# Patient Record
Sex: FEMALE | Race: WHITE | Employment: FULL TIME | URBAN - METROPOLITAN AREA
[De-identification: names, ages, dates, MRNs, and addresses within clinical notes are randomized per-mention and may not be internally consistent; named-entity substitution may affect disease eponyms.]

---

## 2017-04-13 RX ORDER — PRAVASTATIN SODIUM 40 MG
TABLET ORAL
Qty: 30 TABLET | Refills: 0 | Status: SHIPPED | OUTPATIENT
Start: 2017-04-13 | End: 2017-06-05 | Stop reason: SDUPTHER

## 2017-05-08 RX ORDER — DULAGLUTIDE 0.75 MG/.5ML
INJECTION, SOLUTION SUBCUTANEOUS
Qty: 2 ML | Refills: 0 | Status: SHIPPED | OUTPATIENT
Start: 2017-05-08 | End: 2017-06-06 | Stop reason: SDUPTHER

## 2017-06-06 RX ORDER — PRAVASTATIN SODIUM 40 MG
TABLET ORAL
Qty: 30 TABLET | Refills: 0 | Status: SHIPPED | OUTPATIENT
Start: 2017-06-06 | End: 2017-07-04 | Stop reason: SDUPTHER

## 2017-06-06 RX ORDER — DULAGLUTIDE 0.75 MG/.5ML
INJECTION, SOLUTION SUBCUTANEOUS
Qty: 2 ML | Refills: 0 | Status: SHIPPED | OUTPATIENT
Start: 2017-06-06 | End: 2017-07-03 | Stop reason: SDUPTHER

## 2017-07-05 RX ORDER — PRAVASTATIN SODIUM 40 MG
TABLET ORAL
Qty: 30 TABLET | Refills: 2 | Status: SHIPPED | OUTPATIENT
Start: 2017-07-05 | End: 2017-10-03 | Stop reason: SDUPTHER

## 2017-07-17 ENCOUNTER — OFFICE VISIT (OUTPATIENT)
Dept: FAMILY MEDICINE CLINIC | Age: 47
End: 2017-07-17

## 2017-07-17 VITALS
HEART RATE: 72 BPM | BODY MASS INDEX: 41.14 KG/M2 | HEIGHT: 66 IN | SYSTOLIC BLOOD PRESSURE: 126 MMHG | RESPIRATION RATE: 20 BRPM | WEIGHT: 256 LBS | TEMPERATURE: 97.9 F | DIASTOLIC BLOOD PRESSURE: 82 MMHG

## 2017-07-17 DIAGNOSIS — S56.911A FOREARM STRAIN, RIGHT, INITIAL ENCOUNTER: ICD-10-CM

## 2017-07-17 DIAGNOSIS — E11.9 TYPE 2 DIABETES MELLITUS WITHOUT COMPLICATION, WITHOUT LONG-TERM CURRENT USE OF INSULIN (HCC): ICD-10-CM

## 2017-07-17 DIAGNOSIS — E78.00 PURE HYPERCHOLESTEROLEMIA: ICD-10-CM

## 2017-07-17 DIAGNOSIS — E11.9 TYPE 2 DIABETES MELLITUS WITHOUT COMPLICATION, WITHOUT LONG-TERM CURRENT USE OF INSULIN (HCC): Primary | ICD-10-CM

## 2017-07-17 DIAGNOSIS — E66.01 OBESITY, MORBID, BMI 40.0-49.9 (HCC): ICD-10-CM

## 2017-07-17 LAB
A/G RATIO: 1.5 (ref 1.1–2.2)
ALBUMIN SERPL-MCNC: 4 G/DL (ref 3.4–5)
ALP BLD-CCNC: 82 U/L (ref 40–129)
ALT SERPL-CCNC: 10 U/L (ref 10–40)
ANION GAP SERPL CALCULATED.3IONS-SCNC: 14 MMOL/L (ref 3–16)
AST SERPL-CCNC: 7 U/L (ref 15–37)
BILIRUB SERPL-MCNC: 0.3 MG/DL (ref 0–1)
BUN BLDV-MCNC: 12 MG/DL (ref 7–20)
CALCIUM SERPL-MCNC: 9.3 MG/DL (ref 8.3–10.6)
CHLORIDE BLD-SCNC: 104 MMOL/L (ref 99–110)
CHOLESTEROL, TOTAL: 171 MG/DL (ref 0–199)
CO2: 22 MMOL/L (ref 21–32)
CREAT SERPL-MCNC: <0.5 MG/DL (ref 0.6–1.1)
ESTIMATED AVERAGE GLUCOSE: 159.9 MG/DL
GFR AFRICAN AMERICAN: >60
GFR NON-AFRICAN AMERICAN: >60
GLOBULIN: 2.7 G/DL
GLUCOSE BLD-MCNC: 148 MG/DL (ref 70–99)
HBA1C MFR BLD: 7.2 %
HDLC SERPL-MCNC: 49 MG/DL (ref 40–60)
LDL CHOLESTEROL CALCULATED: 105 MG/DL
POTASSIUM SERPL-SCNC: 4.4 MMOL/L (ref 3.5–5.1)
SODIUM BLD-SCNC: 140 MMOL/L (ref 136–145)
TOTAL PROTEIN: 6.7 G/DL (ref 6.4–8.2)
TRIGL SERPL-MCNC: 86 MG/DL (ref 0–150)
VLDLC SERPL CALC-MCNC: 17 MG/DL

## 2017-07-17 PROCEDURE — 99214 OFFICE O/P EST MOD 30 MIN: CPT | Performed by: FAMILY MEDICINE

## 2017-08-09 ENCOUNTER — TELEPHONE (OUTPATIENT)
Dept: FAMILY MEDICINE CLINIC | Age: 47
End: 2017-08-09

## 2017-08-09 RX ORDER — FLUCONAZOLE 150 MG/1
150 TABLET ORAL ONCE
Qty: 2 TABLET | Refills: 0 | Status: SHIPPED | OUTPATIENT
Start: 2017-08-09 | End: 2017-08-09

## 2017-08-17 ENCOUNTER — TELEPHONE (OUTPATIENT)
Dept: FAMILY MEDICINE CLINIC | Age: 47
End: 2017-08-17

## 2017-08-17 RX ORDER — FLUCONAZOLE 100 MG/1
100 TABLET ORAL DAILY
Qty: 10 TABLET | Refills: 0 | Status: SHIPPED | OUTPATIENT
Start: 2017-08-17 | End: 2017-08-27

## 2017-10-03 RX ORDER — PRAVASTATIN SODIUM 40 MG
TABLET ORAL
Qty: 30 TABLET | Refills: 5 | Status: SHIPPED | OUTPATIENT
Start: 2017-10-03 | End: 2018-04-09 | Stop reason: SDUPTHER

## 2017-12-28 DIAGNOSIS — E11.9 TYPE 2 DIABETES MELLITUS WITHOUT COMPLICATION, WITHOUT LONG-TERM CURRENT USE OF INSULIN (HCC): Primary | ICD-10-CM

## 2017-12-28 RX ORDER — BLOOD-GLUCOSE METER
1 EACH MISCELLANEOUS DAILY
Qty: 1 DEVICE | Refills: 0 | Status: SHIPPED | OUTPATIENT
Start: 2017-12-28

## 2018-01-05 RX ORDER — DULAGLUTIDE 1.5 MG/.5ML
INJECTION, SOLUTION SUBCUTANEOUS
Qty: 2 ML | Refills: 0 | Status: SHIPPED | OUTPATIENT
Start: 2018-01-05 | End: 2018-02-02 | Stop reason: SDUPTHER

## 2018-01-15 ENCOUNTER — OFFICE VISIT (OUTPATIENT)
Dept: FAMILY MEDICINE CLINIC | Age: 48
End: 2018-01-15

## 2018-01-15 VITALS
DIASTOLIC BLOOD PRESSURE: 80 MMHG | RESPIRATION RATE: 18 BRPM | BODY MASS INDEX: 41.95 KG/M2 | HEART RATE: 86 BPM | WEIGHT: 261 LBS | OXYGEN SATURATION: 98 % | HEIGHT: 66 IN | SYSTOLIC BLOOD PRESSURE: 124 MMHG

## 2018-01-15 DIAGNOSIS — E55.9 VITAMIN D DEFICIENCY: ICD-10-CM

## 2018-01-15 DIAGNOSIS — Z23 NEED FOR INFLUENZA VACCINATION: ICD-10-CM

## 2018-01-15 DIAGNOSIS — E11.9 TYPE 2 DIABETES MELLITUS WITHOUT COMPLICATION, WITHOUT LONG-TERM CURRENT USE OF INSULIN (HCC): ICD-10-CM

## 2018-01-15 DIAGNOSIS — E11.9 TYPE 2 DIABETES MELLITUS WITHOUT COMPLICATION, WITHOUT LONG-TERM CURRENT USE OF INSULIN (HCC): Primary | ICD-10-CM

## 2018-01-15 DIAGNOSIS — E78.00 PURE HYPERCHOLESTEROLEMIA: ICD-10-CM

## 2018-01-15 LAB
A/G RATIO: 1.5 (ref 1.1–2.2)
ALBUMIN SERPL-MCNC: 4 G/DL (ref 3.4–5)
ALP BLD-CCNC: 90 U/L (ref 40–129)
ALT SERPL-CCNC: 12 U/L (ref 10–40)
ANION GAP SERPL CALCULATED.3IONS-SCNC: 16 MMOL/L (ref 3–16)
AST SERPL-CCNC: 11 U/L (ref 15–37)
BILIRUB SERPL-MCNC: <0.2 MG/DL (ref 0–1)
BUN BLDV-MCNC: 16 MG/DL (ref 7–20)
CALCIUM SERPL-MCNC: 9.4 MG/DL (ref 8.3–10.6)
CHLORIDE BLD-SCNC: 101 MMOL/L (ref 99–110)
CO2: 24 MMOL/L (ref 21–32)
CREAT SERPL-MCNC: 0.6 MG/DL (ref 0.6–1.1)
CREATININE URINE: 28.2 MG/DL (ref 28–259)
GFR AFRICAN AMERICAN: >60
GFR NON-AFRICAN AMERICAN: >60
GLOBULIN: 2.6 G/DL
GLUCOSE BLD-MCNC: 261 MG/DL (ref 70–99)
MICROALBUMIN UR-MCNC: <1.2 MG/DL
MICROALBUMIN/CREAT UR-RTO: NORMAL MG/G (ref 0–30)
POTASSIUM SERPL-SCNC: 4.8 MMOL/L (ref 3.5–5.1)
SODIUM BLD-SCNC: 141 MMOL/L (ref 136–145)
TOTAL PROTEIN: 6.6 G/DL (ref 6.4–8.2)

## 2018-01-15 PROCEDURE — 90471 IMMUNIZATION ADMIN: CPT | Performed by: INTERNAL MEDICINE

## 2018-01-15 PROCEDURE — 90630 INFLUENZA, QUADV, 18-64 YRS, ID, PF, MICRO INJ, 0.1ML (FLUZONE QUADV, PF): CPT | Performed by: INTERNAL MEDICINE

## 2018-01-15 PROCEDURE — 99214 OFFICE O/P EST MOD 30 MIN: CPT | Performed by: INTERNAL MEDICINE

## 2018-01-15 ASSESSMENT — ENCOUNTER SYMPTOMS
DIARRHEA: 0
SHORTNESS OF BREATH: 1
WHEEZING: 0
CONSTIPATION: 0
COUGH: 0

## 2018-01-15 ASSESSMENT — PATIENT HEALTH QUESTIONNAIRE - PHQ9
SUM OF ALL RESPONSES TO PHQ9 QUESTIONS 1 & 2: 1
SUM OF ALL RESPONSES TO PHQ QUESTIONS 1-9: 1
1. LITTLE INTEREST OR PLEASURE IN DOING THINGS: 0
2. FEELING DOWN, DEPRESSED OR HOPELESS: 1

## 2018-01-15 NOTE — PROGRESS NOTES
1/15/2018    Chief Complaint   Patient presents with    Diabetes     f/u   DM  Glucose  120-140 in the morning  Says she is \"terrible \"  About checking in the evening  Taking care both parents in wheelchairs. Not cooking  At one point lost  50 lbs. Walking  3 miles a day  Works two jobs  Gained  Some of it back  Could not tolerate janumet    Had bp elevation in college. Used to smoke ,  Quit  11 yrs ago. Pt does not have neuropathy  No ulcers feet  ,  No foot lesions  Occasional ingrown toenail.   goes to a nail salon for toenail issues       On pravastatin  Has some myalgias , nothing unmanageable      Occasional diarrhea ,  Chronic   Used  to have all the time in high school     Brought up mental illness in the family  Uncle commited suicide , 2 gparents with mental illness  She is not having wintertime blues            HPI    Review of Systems   Constitutional: Positive for activity change (less stuctured exercise). Negative for appetite change. Respiratory: Positive for shortness of breath (with shoveling driveway). Negative for cough and wheezing. Gastrointestinal: Negative for constipation and diarrhea. Psychiatric/Behavioral: Negative for dysphoric mood. The patient is nervous/anxious (anxious with job ).         Health Maintenance   Topic Date Due    HIV screen  01/03/1987    Diabetic foot exam  10/31/2017    Diabetic microalbuminuria test  10/31/2017    Diabetic retinal exam  05/11/2018    A1C test (Diabetic or Prediabetic)  07/17/2018    Lipid screen  07/17/2018    Cervical cancer screen  08/10/2018    DTaP/Tdap/Td vaccine (2 - Td) 01/09/2022    Flu vaccine  Completed    Pneumococcal med risk  Completed      Social History     Social History    Marital status: Single     Spouse name: N/A    Number of children: 0    Years of education: N/A     Occupational History    clerical      [employer]     Social History Main Topics    Smoking status: Former Smoker     Quit date: 1/1/2007

## 2018-01-15 NOTE — PROGRESS NOTES
Vaccine Information Sheet, \"Influenza - Inactivated\"  given to Aram Rudolph, or parent/legal guardian of  Aram Rudolph and verbalized understanding. Patient responses:    Have you ever had a reaction to a flu vaccine? No  Are you able to eat eggs without adverse effects? Yes  Do you have any current illness? No  Have you ever had [place] Syndrome? No    Flu vaccine given per order. Please see immunization tab.

## 2018-01-16 LAB
ESTIMATED AVERAGE GLUCOSE: 174.3 MG/DL
HBA1C MFR BLD: 7.7 %

## 2018-02-02 RX ORDER — DULAGLUTIDE 1.5 MG/.5ML
INJECTION, SOLUTION SUBCUTANEOUS
Qty: 2 ML | Refills: 3 | Status: SHIPPED | OUTPATIENT
Start: 2018-02-02 | End: 2018-06-08 | Stop reason: SDUPTHER

## 2018-04-02 ENCOUNTER — OFFICE VISIT (OUTPATIENT)
Dept: FAMILY MEDICINE CLINIC | Age: 48
End: 2018-04-02

## 2018-04-02 VITALS
HEART RATE: 83 BPM | DIASTOLIC BLOOD PRESSURE: 84 MMHG | WEIGHT: 262 LBS | TEMPERATURE: 98.1 F | SYSTOLIC BLOOD PRESSURE: 136 MMHG | HEIGHT: 66 IN | BODY MASS INDEX: 42.11 KG/M2

## 2018-04-02 DIAGNOSIS — M25.571 ACUTE RIGHT ANKLE PAIN: ICD-10-CM

## 2018-04-02 DIAGNOSIS — M75.81 ROTATOR CUFF TENDINITIS, RIGHT: Primary | ICD-10-CM

## 2018-04-02 PROCEDURE — 99213 OFFICE O/P EST LOW 20 MIN: CPT | Performed by: FAMILY MEDICINE

## 2018-04-02 RX ORDER — MELOXICAM 15 MG/1
15 TABLET ORAL DAILY
Qty: 14 TABLET | Refills: 0 | Status: SHIPPED | OUTPATIENT
Start: 2018-04-02 | End: 2018-04-13 | Stop reason: SDUPTHER

## 2018-04-09 RX ORDER — PRAVASTATIN SODIUM 40 MG
TABLET ORAL
Qty: 30 TABLET | Refills: 5 | Status: SHIPPED | OUTPATIENT
Start: 2018-04-09 | End: 2018-11-10 | Stop reason: SDUPTHER

## 2018-04-16 ENCOUNTER — HOSPITAL ENCOUNTER (OUTPATIENT)
Dept: OTHER | Age: 48
Discharge: HOME | End: 2018-04-30
Attending: FAMILY MEDICINE | Admitting: FAMILY MEDICINE

## 2018-04-16 ASSESSMENT — PAIN DESCRIPTION - DESCRIPTORS: DESCRIPTORS: SHOOTING

## 2018-04-16 ASSESSMENT — PAIN DESCRIPTION - FREQUENCY: FREQUENCY: INTERMITTENT

## 2018-04-16 ASSESSMENT — PAIN DESCRIPTION - ONSET: ONSET: GRADUAL

## 2018-04-16 ASSESSMENT — PAIN DESCRIPTION - PROGRESSION: CLINICAL_PROGRESSION: GRADUALLY WORSENING

## 2018-04-16 ASSESSMENT — PAIN DESCRIPTION - ORIENTATION: ORIENTATION: RIGHT

## 2018-04-16 ASSESSMENT — PAIN DESCRIPTION - PAIN TYPE: TYPE: CHRONIC PAIN

## 2018-04-16 ASSESSMENT — PAIN SCALES - GENERAL: PAINLEVEL_OUTOF10: 7

## 2018-04-16 ASSESSMENT — PAIN DESCRIPTION - LOCATION: LOCATION: SHOULDER

## 2018-04-23 ENCOUNTER — HOSPITAL ENCOUNTER (OUTPATIENT)
Dept: PHYSICAL THERAPY | Age: 48
Discharge: HOME | End: 2018-04-24
Admitting: FAMILY MEDICINE

## 2018-04-25 ENCOUNTER — HOSPITAL ENCOUNTER (OUTPATIENT)
Dept: PHYSICAL THERAPY | Age: 48
Discharge: HOME | End: 2018-04-26
Admitting: FAMILY MEDICINE

## 2018-04-25 NOTE — FLOWSHEET NOTE
Physical Therapy Daily Treatment Note  Date:  2018    Patient Name:  Dawn Del Valle    :  1970  MRN: [id number]    Restrictions/Precautions: Position Activity Restriction  Other position/activity restrictions: No fall risk    Pertinent Medical History: Additional Pertinent Hx: PLOF: Independent, HTN, DM    Medical/Treatment Diagnosis Information:  · Diagnosis: Rotator Cuff tendinitis, right  · Treatment Diagnosis: Limited mobility and strength of right shoulder, pain and decreased function of right shoulder    Insurance/Certification information:  PT Insurance Information: [employer]  Physician Information:  Referring Practitioner: Dr. Vincent Brady of care signed (Y/N):  Sent to Dr. Kate Falcon 18    Visit# / total visits:    Pain level:  5/10     G-Code (if applicable):      Date / Visit # G-Code Applied:   visit  PT G-Codes  Functional Assessment Tool Used: Ethlyn Kayser  Score: 32  Functional Limitation: Carrying, moving and handling objects  Carrying, Moving and Handling Objects Current Status (): At least 40 percent but less than 60 percent impaired, limited or restricted  Carrying, Moving and Handling Objects Goal Status (): At least 20 percent but less than 40 percent impaired, limited or restricted    Progress Note: [x]  Yes  [x]  No  Next due by: Visit #10      History of Injury: See below  Subjective:  Subjective  Subjective: Pt suffered no incident or injury, but patient is helping her parents with transfers and lifting wheelchairs, doing grocery shopping, laundry, etc and working. Pt  has noted steady loss of mobility and lingering pain. Pain started in right bicep and tricep and had migrated to the right shoulder and she wants to prevent surgery and worsening of pain. 18: Pt reports less shooting pain on shoulder and it was less painful until 6:00 pm the day of treatment.   18: Pt reports that she was feeling much better until she lifted a laundry basket and helped to move scrap metal.  04/25/18 Patient reports shoulder has been more sore last few days for some reason. Objective: See eval  Observation:   Test measurements:        Exercises:  Exercise/Equipment Resistance/Repetitions Other comments   ER/IR with green t band 2 x 10 each Advised to avoid pain or decrease range/reps if painful   Cane ex - flex only 5 sec x 10 4/18/18: Scaption created pain so will hold on this. Continue with flexion    Rows Green 2 x 10 Add                                                             Other Therapeutic Activities:  Patient was educated on diagnosis, plan of care and prognosis of their complaint. Also, frequency and duration of treatments was discussed. Patient was informed of the attendance policy and issued a copy for their records. 4/23/18: Reviewed body mechanics, as pt lifts her parent's wheelchairs into the car. Home Exercise Program: Patient was given written instructions for home exercises as above. Patient performs them correctly and understands purpose. Gave green band to use at home. Manual Treatments:    Crossfriction massage to biceps and STM over right upper arm 10 min kinesio tape biceps area.     Modalities:    US x 8 min at 1/5 W/cm2 to right biceps area, CP to right upper arm x 10 min  Timed Code Treatment Minutes:    45  Total Treatment Minutes:    55    Treatment/Activity Tolerance:  [x] Patient tolerated treatment well [] Patient limited by fatigue  [] Patient limited by pain  [] Patient limited by other medical complications  [] Other:     Prognosis: [x] Good [] Fair  [] Poor    Goals:    Short term goals  Time Frame for Short term goals: 4 weeks  Short term goal 1: Pt will increase right shoulder ROM to nearly WNL  Short term goal 2: Pt will increase Strength to 4+/5 on right shoulder  Short term goal 3: Pt will note decreased pain, indicating healing of injury   Short term goal 4: Pt will be independent in HEP so she can avoid

## 2018-04-30 ENCOUNTER — HOSPITAL ENCOUNTER (OUTPATIENT)
Dept: PHYSICAL THERAPY | Age: 48
Discharge: HOME | End: 2018-05-01
Admitting: FAMILY MEDICINE

## 2018-05-01 ENCOUNTER — HOSPITAL ENCOUNTER (OUTPATIENT)
Dept: OTHER | Age: 48
Discharge: HOME | End: 2018-05-31
Attending: FAMILY MEDICINE | Admitting: FAMILY MEDICINE

## 2018-05-02 ENCOUNTER — HOSPITAL ENCOUNTER (OUTPATIENT)
Dept: PHYSICAL THERAPY | Age: 48
Discharge: HOME | End: 2018-05-03
Admitting: FAMILY MEDICINE

## 2018-05-02 NOTE — FLOWSHEET NOTE
Physical Therapy Daily Treatment Note  Date:  2018    Patient Name:  Meera Cardona    :  1970  MRN: [id number]    Restrictions/Precautions: Position Activity Restriction  Other position/activity restrictions: No fall risk    Pertinent Medical History: Additional Pertinent Hx: PLOF: Independent, HTN, DM    Medical/Treatment Diagnosis Information:  · Diagnosis: Rotator Cuff tendinitis, right  · Treatment Diagnosis: Limited mobility and strength of right shoulder, pain and decreased function of right shoulder    Insurance/Certification information:  PT Insurance Information: [employer]  Physician Information:  Referring Practitioner: Dr. Rain Harvey of care signed (Y/N):  Sent to Dr. Octavio Sosa 18    Visit# / total visits:    Pain level:  5-6/10     G-Code (if applicable):      Date / Visit # G-Code Applied:  18/ visit  PT G-Codes  Functional Assessment Tool Used: Rolm Soto  Score: 32  Functional Limitation: Carrying, moving and handling objects  Carrying, Moving and Handling Objects Current Status (): At least 40 percent but less than 60 percent impaired, limited or restricted  Carrying, Moving and Handling Objects Goal Status (): At least 20 percent but less than 40 percent impaired, limited or restricted    Progress Note: [x]  Yes  [x]  No  Next due by: Visit #10      History of Injury: See below  Subjective:  Subjective  Subjective: Pt suffered no incident or injury, but patient is helping her parents with transfers and lifting wheelchairs, doing grocery shopping, laundry, etc and working. Pt  has noted steady loss of mobility and lingering pain. Pain started in right bicep and tricep and had migrated to the right shoulder and she wants to prevent surgery and worsening of pain. 18: Pt reports less shooting pain on shoulder and it was less painful until 6:00 pm the day of treatment.   18: Pt reports that she was feeling much better until she lifted a laundry basket and helped to move scrap metal.  04/25/18 Patient reports shoulder has been more sore last few days for some reason. 4/30/18: Pt reports that she is better but not as well as she was before the flareup.  05/02/18: Patient reports pain intensity and frequency is decreasing. Objective: See eval  Observation:   Test measurements:        Exercises:  Exercise/Equipment Resistance/Repetitions Other comments   ER/IR with green t band 2 x 10 each Advised to avoid pain or decrease range/reps if painful   Cane ex - flex only 5 sec x 10 4/18/18: Scaption created pain so will hold on this. Continue with flexion    Rows Green 2 x 10 Added   UBE 5 min wl 4 Added 4/30/18                                                        Other Therapeutic Activities:  Patient was educated on diagnosis, plan of care and prognosis of their complaint. Also, frequency and duration of treatments was discussed. Patient was informed of the attendance policy and issued a copy for their records. 4/23/18: Reviewed body mechanics, as pt lifts her parent's wheelchairs into the car. Home Exercise Program: Patient was given written instructions for home exercises as above. Patient performs them correctly and understands purpose. Gave green band to use at home. Manual Treatments:    Crossfriction massage to biceps and STM over right upper arm 10 min, kinesio tape biceps and triceps area.     Modalities:    US x 8 min at 1/5 W/cm2 to right biceps area, CP to right upper arm x 10 min  Timed Code Treatment Minutes:    45  Total Treatment Minutes:    55    Treatment/Activity Tolerance:  [x] Patient tolerated treatment well [] Patient limited by fatigue  [] Patient limited by pain  [] Patient limited by other medical complications  [] Other:     Prognosis: [x] Good [] Fair  [] Poor    Goals:    Short term goals  Time Frame for Short term goals: 4 weeks  Short term goal 1: Pt will increase right shoulder ROM to nearly WNL  Short term goal 2: Pt will increase Strength to 4+/5 on right shoulder  Short term goal 3: Pt will note decreased pain, indicating healing of injury   Short term goal 4: Pt will be independent in HEP so she can avoid future injury              Patient Requires Follow-up: [x] Yes  [] No    Plan:   [] Continue per plan of care [] Alter current plan (see comments)  [x] Plan of care initiated [] Hold pending MD visit [] Discharge    Plan for Next Session: Add exercises and review body mechanics to get wheelchair into car ,reading in bed and using back pack instead of heavy purse.     Electronically signed by:  Lesley Moran PTA,

## 2018-05-07 ENCOUNTER — HOSPITAL ENCOUNTER (OUTPATIENT)
Dept: PHYSICAL THERAPY | Age: 48
Discharge: HOME | End: 2018-05-08
Admitting: FAMILY MEDICINE

## 2018-05-09 ENCOUNTER — HOSPITAL ENCOUNTER (OUTPATIENT)
Dept: PHYSICAL THERAPY | Age: 48
Discharge: HOME | End: 2018-05-10
Admitting: FAMILY MEDICINE

## 2018-05-09 NOTE — FLOWSHEET NOTE
Physical Therapy Daily Treatment Note  Date:  2018    Patient Name:  Camille Jaimes    :  1970  MRN: [id number]    Restrictions/Precautions: Position Activity Restriction  Other position/activity restrictions: No fall risk    Pertinent Medical History: Additional Pertinent Hx: PLOF: Independent, HTN, DM    Medical/Treatment Diagnosis Information:  · Diagnosis: Rotator Cuff tendinitis, right  · Treatment Diagnosis: Limited mobility and strength of right shoulder, pain and decreased function of right shoulder    Insurance/Certification information:  PT Insurance Information: Hum[employer]  Physician Information:  Referring Practitioner: Dr. Phyllis Sauer of care signed (Y/N):  Sent to Dr. Dara Yoder 18    Visit# / total visits:    Pain level:  3-4/10     G-Code (if applicable):      Date / Visit # G-Code Applied:  18/ visit  PT G-Codes  Functional Assessment Tool Used: Pily Math  Score: 32  Functional Limitation: Carrying, moving and handling objects  Carrying, Moving and Handling Objects Current Status (): At least 40 percent but less than 60 percent impaired, limited or restricted  Carrying, Moving and Handling Objects Goal Status (): At least 20 percent but less than 40 percent impaired, limited or restricted    Progress Note: [x]  Yes  [x]  No  Next due by: Visit #10      History of Injury: See below  Subjective:  Subjective  Subjective: Pt suffered no incident or injury, but patient is helping her parents with transfers and lifting wheelchairs, doing grocery shopping, laundry, etc and working. Pt  has noted steady loss of mobility and lingering pain. Pain started in right bicep and tricep and had migrated to the right shoulder and she wants to prevent surgery and worsening of pain. 18: Pt reports less shooting pain on shoulder and it was less painful until 6:00 pm the day of treatment.   18: Pt reports that she was feeling much better until she lifted a laundry basket and helped to move scrap metal.  04/25/18 Patient reports shoulder has been more sore last few days for some reason. 4/30/18: Pt reports that she is better but not as well as she was before the flareup.  05/02/18: Patient reports pain intensity and frequency is decreasing. 5/7/18: Pt reports that her shoulder is improving , but she \"tweaked it\" last night pulling up her sheets. 5/9/18: Pt reports that the pain is still less than it was before, but she still has pain on lateral deltoid of right UE. Objective: See eval  Observation:   Test measurements:        Exercises:  Exercise/Equipment Resistance/Repetitions Other comments   ER/IR with green t band 2 x 10 each Advised to avoid pain or decrease range/reps if painful   Cane ex - flex only 5 sec x 10 4/18/18: Scaption created pain so will hold on this. Continue with flexion    Rows Green 2 x 10 Added   UBE 5 min wl 4 Added 4/30/18   Cane behind back for int rot 10X Added 5/7/18   Rhomboid/tricep stretch  Add                                              Other Therapeutic Activities:  Patient was educated on diagnosis, plan of care and prognosis of their complaint. Also, frequency and duration of treatments was discussed. Patient was informed of the attendance policy and issued a copy for their records. 4/23/18: Reviewed body mechanics, as pt lifts her parent's wheelchairs into the car. Home Exercise Program: Patient was given written instructions for home exercises as above. Patient performs them correctly and understands purpose. Gave green band to use at home. Manual Treatments:    Crossfriction massage to biceps and STM over right upper arm 10 min, kinesio tape biceps and triceps area.     Modalities:    US x 8 min at 1/5 W/cm2 to right biceps area, CP to right upper arm x 10 min  Timed Code Treatment Minutes:    50  Total Treatment Minutes:    60    Treatment/Activity Tolerance:  [x] Patient tolerated treatment well [] Patient limited by fatigue  [] Patient limited by pain  [] Patient limited by other medical complications  [] Other:     Prognosis: [x] Good [] Fair  [] Poor    Goals:    Short term goals  Time Frame for Short term goals: 4 weeks  Short term goal 1: Pt will increase right shoulder ROM to nearly WNL  Short term goal 2: Pt will increase Strength to 4+/5 on right shoulder  Short term goal 3: Pt will note decreased pain, indicating healing of injury   Short term goal 4: Pt will be independent in HEP so she can avoid future injury              Patient Requires Follow-up: [x] Yes  [] No    Plan:   [] Continue per plan of care [] Alter current plan (see comments)  [x] Plan of care initiated [] Hold pending MD visit [] Discharge    Plan for Next Session: Add exercises. Will continue 4 more visits.     Electronically signed by:  Chasity Macedo, PT,

## 2018-05-16 ENCOUNTER — HOSPITAL ENCOUNTER (OUTPATIENT)
Dept: PHYSICAL THERAPY | Age: 48
Discharge: HOME | End: 2018-05-17
Admitting: FAMILY MEDICINE

## 2018-05-21 ENCOUNTER — HOSPITAL ENCOUNTER (OUTPATIENT)
Dept: PHYSICAL THERAPY | Age: 48
Discharge: HOME | End: 2018-05-22
Admitting: FAMILY MEDICINE

## 2018-05-23 ENCOUNTER — HOSPITAL ENCOUNTER (OUTPATIENT)
Dept: PHYSICAL THERAPY | Age: 48
Discharge: HOME | End: 2018-05-24
Admitting: FAMILY MEDICINE

## 2018-05-23 NOTE — FLOWSHEET NOTE
Physical Therapy Daily Treatment Note  Date:  2018    Patient Name:  Kati Melissa    :  1970  MRN: [id number]    Restrictions/Precautions: Position Activity Restriction  Other position/activity restrictions: No fall risk    Pertinent Medical History: Additional Pertinent Hx: PLOF: Independent, HTN, DM    Medical/Treatment Diagnosis Information:  · Diagnosis: Rotator Cuff tendinitis, right  · Treatment Diagnosis: Limited mobility and strength of right shoulder, pain and decreased function of right shoulder    Insurance/Certification information:  PT Insurance Information: Hum[employer]  Physician Information:  Referring Practitioner: Dr. Chico Brock of care signed (Y/N):  Sent to Dr. Christophe Obando 18    Visit# / total visits:    Pain level:   1/10     G-Code (if applicable):      Date / Visit # G-Code Applied:   visit  PT G-Codes  Functional Assessment Tool Used: Rosamaria Kat  Score: 32  Functional Limitation: Carrying, moving and handling objects  Carrying, Moving and Handling Objects Current Status (): At least 40 percent but less than 60 percent impaired, limited or restricted  Carrying, Moving and Handling Objects Goal Status (): At least 20 percent but less than 40 percent impaired, limited or restricted    Progress Note: [x]  Yes  [x]  No  Next due by: Visit #10      History of Injury: See below  Subjective:  Subjective  Subjective: Pt suffered no incident or injury, but patient is helping her parents with transfers and lifting wheelchairs, doing grocery shopping, laundry, etc and working. Pt  has noted steady loss of mobility and lingering pain. Pain started in right bicep and tricep and had migrated to the right shoulder and she wants to prevent surgery and worsening of pain. 18: Pt reports less shooting pain on shoulder and it was less painful until 6:00 pm the day of treatment.   18: Pt reports that she was feeling much better until she lifted a laundry basket and helped to move scrap metal.  04/25/18 Patient reports shoulder has been more sore last few days for some reason. 4/30/18: Pt reports that she is better but not as well as she was before the flareup.  05/02/18: Patient reports pain intensity and frequency is decreasing. 5/7/18: Pt reports that her shoulder is improving , but she \"tweaked it\" last night pulling up her sheets. 5/9/18: Pt reports that the pain is still less than it was before, but she still has pain on lateral deltoid of right UE.  5/16/18 Pt reports her right shoulder is feeling better. 05/21/18 Patient reports shoulder was a little more sore after work over the weekend since she had to do more reaching.  5/23/18: Pt reports that she is able to do more activities that she has not been able to do for the past year. \"I bought some more resistive bands for HEP. \"    Objective: See eval  Observation:   Test measurements:        Exercises:  Exercise/Equipment Resistance/Repetitions Other comments   ER/IR with green t band 3 x 10 each Advised to avoid pain or decrease range/reps if painful   T Slide Bilat shoulder Ext. Orange 3 x 10 Small rom tolerated well Try yellow band next rx   Cane ex - flex only 5 sec x 10 4/18/18: Scaption created pain so will hold on this. Continue with flexion    Rows Orange 3 x 10 Added   UBE 5 min wl 4 Added 4/30/18   Cane behind back for int rot 10X Added 5/7/18   Rhomboid/tricep stretch 3 x 30 sec  Added 5/23/18                   Sleeper stretch 30\" x 4 5/16/18                          Other Therapeutic Activities:  Patient was educated on diagnosis, plan of care and prognosis of their complaint. Also, frequency and duration of treatments was discussed. Patient was informed of the attendance policy and issued a copy for their records. 4/23/18: Reviewed body mechanics, as pt lifts her parent's wheelchairs into the car. Home Exercise Program: Patient was given written instructions for home exercises as above. Patient performs them correctly and understands purpose. Gave green band to use at home. 5/23/18: Encouraged pt to continue with HEP. Gave written instructions for rhomboid stretches which pt performs well. Manual Treatments:    Crossfriction massage to biceps and STM over right upper arm   Stretch into IR  15 min,   a. (not applied due to skin irritation)    Modalities:    US x 8 min at 1/5 W/cm2 to right biceps area, CP to right upper arm x 10 min  Timed Code Treatment Minutes:    55  Total Treatment Minutes:    65    Treatment/Activity Tolerance:  [x] Patient tolerated treatment well [] Patient limited by fatigue  [] Patient limited by pain  [] Patient limited by other medical complications  [] Other:     Prognosis: [x] Good [] Fair  [] Poor    Goals:    Short term goals  Time Frame for Short term goals: 4 weeks  Short term goal 1: Pt will increase right shoulder ROM to nearly WNL  Short term goal 2: Pt will increase Strength to 4+/5 on right shoulder  Short term goal 3: Pt will note decreased pain, indicating healing of injury   Short term goal 4: Pt will be independent in HEP so she can avoid future injury              Patient Requires Follow-up: [x] Yes  [] No    Plan:   [] Continue per plan of care [] Alter current plan (see comments)  [x] Plan of care initiated [] Hold pending MD visit [] Discharge    Plan for Next Session:. Will continue 1 more visit, then DC with HEP.   Electronically signed by:  Lita Griffiths PT,

## 2018-05-30 ENCOUNTER — HOSPITAL ENCOUNTER (OUTPATIENT)
Dept: PHYSICAL THERAPY | Age: 48
Discharge: HOME | End: 2018-06-30
Admitting: FAMILY MEDICINE

## 2018-05-30 NOTE — FLOWSHEET NOTE
Physical Therapy Daily Treatment Note  Date:  2018    Patient Name:  Frances Rodriguez    :  1970  MRN: [id number]    Restrictions/Precautions: Position Activity Restriction  Other position/activity restrictions: No fall risk    Pertinent Medical History: Additional Pertinent Hx: PLOF: Independent, HTN, DM    Medical/Treatment Diagnosis Information:  · Diagnosis: Rotator Cuff tendinitis, right  · Treatment Diagnosis: Limited mobility and strength of right shoulder, pain and decreased function of right shoulder    Insurance/Certification information:  PT Insurance Information: [employer]  Physician Information:  Referring Practitioner: Dr. Mega Lovell of care signed (Y/N):  Sent to Dr. Graham Pendleton 18    Visit# / total visits:    Pain level:   1/10     G-Code (if applicable):      Date / Visit # G-Code Applied:   visit  PT G-Codes  Functional Assessment Tool Used: Otilio Colin  Score: 32  Functional Limitation: Carrying, moving and handling objects  Carrying, Moving and Handling Objects Current Status (): At least 40 percent but less than 60 percent impaired, limited or restricted  Carrying, Moving and Handling Objects Goal Status (): At least 20 percent but less than 40 percent impaired, limited or restricted    Progress Note: [x]  Yes  [x]  No  Next due by: Visit #10      History of Injury: See below  Subjective:  Subjective  Subjective: Pt suffered no incident or injury, but patient is helping her parents with transfers and lifting wheelchairs, doing grocery shopping, laundry, etc and working. Pt  has noted steady loss of mobility and lingering pain. Pain started in right bicep and tricep and had migrated to the right shoulder and she wants to prevent surgery and worsening of pain. 18: Pt reports less shooting pain on shoulder and it was less painful until 6:00 pm the day of treatment.   18: Pt reports that she was feeling much better until she lifted a laundry

## 2018-05-30 NOTE — PROGRESS NOTES
Quick DASH      Patient: Warren Manzano  : 1970  MRN: [id number]  Date: 2018  Electronically Signed by: Parisa Duarte    Instructions:   · This Questionnaire asks about your symptoms as well as your ability to perform certain activities. · Please answer every question, based on your condition in the last week, by selecting the appropriate number. · If you did not have the opportunity to perform the activity in the past week, please make your best estimate of which response would be the most accurate. · It doesn't matter which hand or arm you use to perform the activity; please answer based on your ability regardless of how you perform the task. Please rate your ability to do the following activities in the last week by selecting the number below the appropriate response      No Difficulty Mild Difficulty Moderate Difficulty Severe Difficulty Unable   1. Open a tight or new jar    [x] 1  [] 2  [] 3  [] 4  [] 5   2. Do heavy household chores (e.g., wash walls, floors)  [x] 1  [] 2  [] 3  [] 4  [] 5   3. Desiree a shopping bag or briefcase  [x] 1  [] 2  [] 3  [] 4  [] 5   4. Wash your back    [] 1  [] 2  [x] 3  [] 4  [] 5   5. Use a knife to cut food    [x] 1  [] 2  [] 3  [] 4  [] 5   6. Recreational activities in which you take some force or impact through your arm, shoulder, or hand (e.g., golf, hammering, tennis, etc.)  [] 1  [x] 2   3  [] 4  [] 5      Not At All  Slightly Moderately Quite A Bit  Extremely   7. During the past week, to what extent has your arm, shoulder or hand problem interfered with your normal social activities with family, friends, neighbors or groups? [] 1  [x] 2  [] 3  [] 4  [] 5      Not Limited At All Slightly Limited  Moderately Limited Very Limited  Unable   8. During the past week, were you limited in your work or other regular daily activities as a result of your arm, shoulder or hand problem?   [] 1  [x] 2  [] 3  [] 4  [] 5     Please rate the severity of

## 2018-06-01 ENCOUNTER — HOSPITAL ENCOUNTER (OUTPATIENT)
Dept: OTHER | Age: 48
Discharge: HOME | End: 2018-06-01
Attending: FAMILY MEDICINE | Admitting: FAMILY MEDICINE

## 2018-06-20 NOTE — DISCHARGE SUMMARY
Pt will increase right shoulder ROM to nearly WNL/MET  Short term goal 2: Pt will increase Strength to 4+/5 on right shoulder/Partially MET  Short term goal 3: Pt will note decreased pain, indicating healing of injury /MET  Short term goal 4: Pt will be independent in HEP so she can avoid future injury/MET  Patient Goals   Patient goals : \"Heal injury/avoid surgery/ preventive exercises to avoid future injury\"/MET     Current Frequency/Duration:  # Days per week: [] 1 day # Weeks: [] 1 week [] 4 weeks      [x] 2 days? [] 2 weeks [] 5 weeks      [] 3 days   [] 3 weeks [x] 6 weeks     Rehab Potential: [] Excellent [x] Good [] Fair  [] Poor     Goal Status:  [] Achieved [x] Partially Achieved  [] Not Achieved     Patient Status: [] Continue per initial plan of Care     [x] Patient now discharged     [] Additional visits requested, Please re-certify for additional visits:      Requested frequency/duration:  X/week for weeks    Electronically signed by:  Mahamed Cooper PT    If you have any questions or concerns, please don't hesitate to call.   Thank you for your referral.    Physician Signature:________________________________Date:__________________  By signing above, therapists plan is approved by physician

## 2018-07-23 ENCOUNTER — OFFICE VISIT (OUTPATIENT)
Dept: FAMILY MEDICINE CLINIC | Age: 48
End: 2018-07-23

## 2018-07-23 VITALS
WEIGHT: 262 LBS | OXYGEN SATURATION: 97 % | HEIGHT: 66 IN | DIASTOLIC BLOOD PRESSURE: 80 MMHG | RESPIRATION RATE: 12 BRPM | BODY MASS INDEX: 42.11 KG/M2 | TEMPERATURE: 98.8 F | SYSTOLIC BLOOD PRESSURE: 130 MMHG | HEART RATE: 80 BPM

## 2018-07-23 DIAGNOSIS — E78.00 PURE HYPERCHOLESTEROLEMIA: ICD-10-CM

## 2018-07-23 DIAGNOSIS — E11.9 TYPE 2 DIABETES MELLITUS WITHOUT COMPLICATION, WITHOUT LONG-TERM CURRENT USE OF INSULIN (HCC): Primary | ICD-10-CM

## 2018-07-23 DIAGNOSIS — E66.01 OBESITY, MORBID, BMI 40.0-49.9 (HCC): ICD-10-CM

## 2018-07-23 LAB
A/G RATIO: 1.6 (ref 1.1–2.2)
ALBUMIN SERPL-MCNC: 4.2 G/DL (ref 3.4–5)
ALP BLD-CCNC: 112 U/L (ref 40–129)
ALT SERPL-CCNC: 15 U/L (ref 10–40)
ANION GAP SERPL CALCULATED.3IONS-SCNC: 14 MMOL/L (ref 3–16)
AST SERPL-CCNC: 13 U/L (ref 15–37)
BILIRUB SERPL-MCNC: 0.4 MG/DL (ref 0–1)
BUN BLDV-MCNC: 15 MG/DL (ref 7–20)
CALCIUM SERPL-MCNC: 9.5 MG/DL (ref 8.3–10.6)
CHLORIDE BLD-SCNC: 101 MMOL/L (ref 99–110)
CHOLESTEROL, TOTAL: 181 MG/DL (ref 0–199)
CO2: 24 MMOL/L (ref 21–32)
CREAT SERPL-MCNC: <0.5 MG/DL (ref 0.6–1.1)
GFR AFRICAN AMERICAN: >60
GFR NON-AFRICAN AMERICAN: >60
GLOBULIN: 2.6 G/DL
GLUCOSE BLD-MCNC: 205 MG/DL (ref 70–99)
HBA1C MFR BLD: 7.8 %
HDLC SERPL-MCNC: 48 MG/DL (ref 40–60)
LDL CHOLESTEROL CALCULATED: 117 MG/DL
POTASSIUM SERPL-SCNC: 4.8 MMOL/L (ref 3.5–5.1)
SODIUM BLD-SCNC: 139 MMOL/L (ref 136–145)
TOTAL PROTEIN: 6.8 G/DL (ref 6.4–8.2)
TRIGL SERPL-MCNC: 80 MG/DL (ref 0–150)
VLDLC SERPL CALC-MCNC: 16 MG/DL

## 2018-07-23 PROCEDURE — 83036 HEMOGLOBIN GLYCOSYLATED A1C: CPT | Performed by: FAMILY MEDICINE

## 2018-07-23 PROCEDURE — 99214 OFFICE O/P EST MOD 30 MIN: CPT | Performed by: FAMILY MEDICINE

## 2018-07-23 RX ORDER — GLIMEPIRIDE 2 MG/1
2 TABLET ORAL EVERY MORNING
Qty: 30 TABLET | Refills: 5 | Status: SHIPPED | OUTPATIENT
Start: 2018-07-23 | End: 2019-02-02 | Stop reason: SDUPTHER

## 2018-07-23 ASSESSMENT — PATIENT HEALTH QUESTIONNAIRE - PHQ9
1. LITTLE INTEREST OR PLEASURE IN DOING THINGS: 0
2. FEELING DOWN, DEPRESSED OR HOPELESS: 0
SUM OF ALL RESPONSES TO PHQ9 QUESTIONS 1 & 2: 0
SUM OF ALL RESPONSES TO PHQ QUESTIONS 1-9: 0

## 2018-07-23 NOTE — PROGRESS NOTES
edema (mild pretibial edema). Feet normal; no lesions. Sensation intact to SW monofilament and vibratory sense bilaterally with intact pulses. Neurological: She is alert and oriented to person, place, and time. Skin: Skin is warm and dry. Psychiatric: She has a normal mood and affect. Her behavior is normal. Judgment and thought content normal.   Nursing note and vitals reviewed. Assessment:      1. Type 2 diabetes mellitus without complication, without long-term current use of insulin ([facility])  - POCT glycosylated hemoglobin (Hb A1C) stable at 7.8. Need to stop invokana due to side effects. Continue trulicity 1.5  Continue metformin 1000 bid  Add amaryl 2mg    2. Obesity, morbid, BMI 40.0-49.9 ([address]  - The patient is asked to make an attempt to improve diet and exercise patterns to aid in medical management of this problem. She is not able to go to [employer] or dietician at this time. We discussed phone apps to help monitor calories. 3. Pure hypercholesterolemia  - retest lipids today. Is fasting.           Plan:      F/u 3 mo

## 2018-08-20 ENCOUNTER — TELEPHONE (OUTPATIENT)
Dept: FAMILY MEDICINE CLINIC | Age: 48
End: 2018-08-20

## 2018-10-29 ENCOUNTER — OFFICE VISIT (OUTPATIENT)
Dept: FAMILY MEDICINE CLINIC | Age: 48
End: 2018-10-29
Payer: COMMERCIAL

## 2018-10-29 VITALS
BODY MASS INDEX: 43.71 KG/M2 | RESPIRATION RATE: 16 BRPM | TEMPERATURE: 98.4 F | HEART RATE: 91 BPM | SYSTOLIC BLOOD PRESSURE: 132 MMHG | WEIGHT: 272 LBS | DIASTOLIC BLOOD PRESSURE: 78 MMHG | HEIGHT: 66 IN

## 2018-10-29 DIAGNOSIS — E78.00 PURE HYPERCHOLESTEROLEMIA: ICD-10-CM

## 2018-10-29 DIAGNOSIS — E11.9 TYPE 2 DIABETES MELLITUS WITHOUT COMPLICATION, WITHOUT LONG-TERM CURRENT USE OF INSULIN (HCC): Primary | ICD-10-CM

## 2018-10-29 DIAGNOSIS — E66.01 OBESITY, MORBID, BMI 40.0-49.9 (HCC): ICD-10-CM

## 2018-10-29 LAB — HBA1C MFR BLD: 6.8 %

## 2018-10-29 PROCEDURE — 83036 HEMOGLOBIN GLYCOSYLATED A1C: CPT | Performed by: FAMILY MEDICINE

## 2018-10-29 PROCEDURE — 90682 RIV4 VACC RECOMBINANT DNA IM: CPT | Performed by: FAMILY MEDICINE

## 2018-10-29 PROCEDURE — 90471 IMMUNIZATION ADMIN: CPT | Performed by: FAMILY MEDICINE

## 2018-10-29 PROCEDURE — 99214 OFFICE O/P EST MOD 30 MIN: CPT | Performed by: FAMILY MEDICINE

## 2018-10-29 RX ORDER — METFORMIN HYDROCHLORIDE 500 MG/1
2000 TABLET, EXTENDED RELEASE ORAL
Qty: 120 TABLET | Refills: 5 | Status: SHIPPED | OUTPATIENT
Start: 2018-10-29 | End: 2019-05-07 | Stop reason: SDUPTHER

## 2018-10-29 NOTE — PROGRESS NOTES
metFORMIN (GLUCOPHAGE XR) 500 MG extended release tablet Take 4 tablets by mouth daily (with breakfast) 120 tablet 5    TRULICITY 1.5 CJ/4.7JF SOPN INJECT 1 AND ONE-HALF MG INTO THE SKIN ONCE A WEEK 2 mL 3    glimepiride (AMARYL) 2 MG tablet Take 1 tablet by mouth every morning 30 tablet 5    pravastatin (PRAVACHOL) 40 MG tablet TAKE 1 TABLET BY MOUTH EVERY EVENING 30 tablet 5    Blood Glucose Monitoring Suppl (TRUE METRIX METER) SAJI 1 Device by Does not apply route daily 1 Device 0    glucose blood VI test strips (TRUE METRIX BLOOD GLUCOSE TEST) strip 1 each by In Vitro route daily As needed. 100 each 3    FREESTYLE LANCETS MISC TEST DAILY 100 each 11    FREESTYLE LITE strip TEST EVERY  strip 5    glucose monitoring kit (FREESTYLE) monitoring kit 1 kit by Does not apply route daily as needed 1 kit 0     No current facility-administered medications for this visit. Immunization History   Administered Date(s) Administered    Influenza, Intradermal, Preservative free 10/14/2015    Influenza, Intradermal, Quadrivalent, Preservative Free 10/31/2016, 01/15/2018    Pneumococcal Polysaccharide ([id number]) 10/14/2015    Tdap (Boostrix, Adacel) 01/09/2012        Social History   Substance Use Topics    Smoking status: Former Smoker     Quit date: 1/1/2007    Smokeless tobacco: Never Used    Alcohol use 0.0 oz/week      Comment: rarely        Review of Systems No chest pains, dizziness, heart palpitations, dyspnea, lightheadedness, worsening edema. Objective:   Physical Exam   Constitutional: She is oriented to person, place, and time. She appears well-developed and well-nourished. Neck: Normal range of motion. Neck supple. Carotid bruit is not present. No thyromegaly present. Cardiovascular: Normal rate, regular rhythm, normal heart sounds and intact distal pulses. No murmur heard. Pulmonary/Chest: Effort normal and breath sounds normal. No respiratory distress. She has no wheezes.

## 2018-12-13 ENCOUNTER — APPOINTMENT (OUTPATIENT)
Dept: GENERAL RADIOLOGY | Age: 48
End: 2018-12-13
Payer: OTHER MISCELLANEOUS

## 2018-12-13 ENCOUNTER — HOSPITAL ENCOUNTER (EMERGENCY)
Age: 48
Discharge: HOME HEALTH | End: 2018-12-14
Attending: EMERGENCY MEDICINE
Payer: OTHER MISCELLANEOUS

## 2018-12-13 DIAGNOSIS — S63.502A SPRAIN OF LEFT WRIST, INITIAL ENCOUNTER: ICD-10-CM
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
## 2018-12-13 DIAGNOSIS — S16.1XXA ACUTE STRAIN OF NECK MUSCLE, INITIAL ENCOUNTER: ICD-10-CM

## 2018-12-13 PROCEDURE — 73130 X-RAY EXAM OF HAND: CPT

## 2018-12-13 PROCEDURE — 73110 X-RAY EXAM OF WRIST: CPT

## 2018-12-13 PROCEDURE — 99284 EMERGENCY DEPT VISIT MOD MDM: CPT

## 2018-12-13 ASSESSMENT — PAIN DESCRIPTION - LOCATION: LOCATION: HAND;WRIST

## 2018-12-13 ASSESSMENT — PAIN DESCRIPTION - DESCRIPTORS: DESCRIPTORS: ACHING

## 2018-12-13 ASSESSMENT — PAIN DESCRIPTION - PAIN TYPE: TYPE: ACUTE PAIN

## 2018-12-13 ASSESSMENT — PAIN SCALES - GENERAL: PAINLEVEL_OUTOF10: 3

## 2018-12-13 ASSESSMENT — PAIN DESCRIPTION - ORIENTATION: ORIENTATION: LEFT

## 2018-12-13 ASSESSMENT — PAIN DESCRIPTION - FREQUENCY: FREQUENCY: CONTINUOUS

## 2018-12-14 VITALS
TEMPERATURE: 97.8 F | OXYGEN SATURATION: 99 % | BODY MASS INDEX: 44.59 KG/M2 | SYSTOLIC BLOOD PRESSURE: 152 MMHG | HEART RATE: 81 BPM | RESPIRATION RATE: 16 BRPM | WEIGHT: 276.24 LBS | DIASTOLIC BLOOD PRESSURE: 72 MMHG

## 2018-12-14 PROCEDURE — 6370000000 HC RX 637 (ALT 250 FOR IP): Performed by: EMERGENCY MEDICINE

## 2018-12-14 RX ORDER — IBUPROFEN 600 MG/1
600 TABLET ORAL ONCE
Status: COMPLETED | OUTPATIENT
Start: 2018-12-14 | End: 2018-12-14

## 2018-12-14 RX ADMIN — IBUPROFEN 600 MG: 600 TABLET ORAL at 00:21

## 2018-12-14 ASSESSMENT — PAIN SCALES - GENERAL: PAINLEVEL_OUTOF10: 5
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
## 2018-12-19 ENCOUNTER — OFFICE VISIT (OUTPATIENT)
Dept: FAMILY MEDICINE CLINIC | Age: 48
End: 2018-12-19
Payer: COMMERCIAL

## 2018-12-19 VITALS
RESPIRATION RATE: 14 BRPM | TEMPERATURE: 98.4 F | DIASTOLIC BLOOD PRESSURE: 78 MMHG | HEART RATE: 81 BPM | SYSTOLIC BLOOD PRESSURE: 136 MMHG | WEIGHT: 277 LBS | BODY MASS INDEX: 44.52 KG/M2 | HEIGHT: 66 IN

## 2018-12-19 DIAGNOSIS — S16.1XXA ACUTE STRAIN OF NECK MUSCLE, INITIAL ENCOUNTER: Primary | ICD-10-CM

## 2018-12-19 DIAGNOSIS — S60.222A CONTUSION OF LEFT HAND, INITIAL ENCOUNTER: ICD-10-CM

## 2018-12-19 PROCEDURE — 99213 OFFICE O/P EST LOW 20 MIN: CPT | Performed by: FAMILY MEDICINE

## 2019-01-28 ENCOUNTER — OFFICE VISIT (OUTPATIENT)
Dept: FAMILY MEDICINE CLINIC | Age: 49
End: 2019-01-28
Payer: COMMERCIAL

## 2019-01-28 VITALS
HEIGHT: 66 IN | BODY MASS INDEX: 44.03 KG/M2 | RESPIRATION RATE: 12 BRPM | HEART RATE: 105 BPM | TEMPERATURE: 97.8 F | WEIGHT: 274 LBS | DIASTOLIC BLOOD PRESSURE: 84 MMHG | SYSTOLIC BLOOD PRESSURE: 132 MMHG

## 2019-01-28 DIAGNOSIS — E11.9 TYPE 2 DIABETES MELLITUS WITHOUT COMPLICATION, WITHOUT LONG-TERM CURRENT USE OF INSULIN (HCC): Primary | ICD-10-CM

## 2019-01-28 DIAGNOSIS — E78.00 PURE HYPERCHOLESTEROLEMIA: ICD-10-CM

## 2019-01-28 DIAGNOSIS — E11.9 TYPE 2 DIABETES MELLITUS WITHOUT COMPLICATION, WITHOUT LONG-TERM CURRENT USE OF INSULIN (HCC): ICD-10-CM

## 2019-01-28 DIAGNOSIS — E66.01 OBESITY, MORBID, BMI 40.0-49.9 (HCC): ICD-10-CM

## 2019-01-28 LAB
CREATININE URINE: 264.1 MG/DL (ref 28–259)
HBA1C MFR BLD: 7 %
MICROALBUMIN UR-MCNC: 3.3 MG/DL
MICROALBUMIN/CREAT UR-RTO: 12.5 MG/G (ref 0–30)

## 2019-01-28 PROCEDURE — 83036 HEMOGLOBIN GLYCOSYLATED A1C: CPT | Performed by: FAMILY MEDICINE

## 2019-01-28 PROCEDURE — 99214 OFFICE O/P EST MOD 30 MIN: CPT | Performed by: FAMILY MEDICINE

## 2019-04-30 NOTE — FLOWSHEET NOTE
Dear Blel    It was very nice to see you today.    You were seen for   Bell was seen today for gyn exam and er f/u.    Diagnoses and all orders for this visit:  Well woman exam:   Pap smear for cervical cancer screening with h/o ASCUS with positive high risk HPV cervical 1/7/16  -     THINPREP PAP TEST WITH HPV REGARDLESS  -     SERVICE TO OB GYN  -     Declined STI testing    Bleeding; contact bleeding during speculum insertion        -    Pregnancy test negative   -     SERVICE TO OB GYN  -     Appt with Dr Maris Gold @ [facility] 11:00 am; 5/3/19 Friday  (enter in the ER door [facility])  (work excuse given from Friday 5/3/19)   -     Seek immediate medical care @ ER ([facility] or [place]) is bleeding > 1 super plus pad / hour (or other concerns)  -     Avoid sexual intercourse or putting anything in vagina    Contraceptive counseling  -reviewed all reversible (declines all except condoms)  and non-reversible reviewed; pt is contemplating permanent but uncertain (will think about this)  -condoms for now; but recommend no intercourse until released by GYN (after work up for bleeding / biopsy)   -encouraged to write letter to self - desire for permanent contraception (if choosing this and take to GYN if decides to proceed)    Right inguinal pain/in process of work up with General surgeon; had inguinal US done @ [facility] today  -follow up with Dr Erlin Mayberry / General surgeron re: right inguinal pain (ultrasound); pt will request release results and note to jail/ Dr Alvarado  -avoid heavy lifting, nothing > 20 #  -follow up as per surgeon    Return to clinic as per GYN & with Dr Mayberry for right inguinal pain / US or sooner if no improvement, worse and any concerns.    Take Care!    Angela Mccormack, MSN, FNP-BC, HN-BC, CCAP  Integrative & Holistic Nurse Practitioner  Clinical Professor, [facility]  [address]   [phone]  Physical Therapy Daily Treatment Note  Date:  2018    Patient Name:  Suzanne Byers    :  1970  MRN: [id number]    Restrictions/Precautions: Position Activity Restriction  Other position/activity restrictions: No fall risk    Pertinent Medical History: Additional Pertinent Hx: PLOF: Independent, HTN, DM    Medical/Treatment Diagnosis Information:  · Diagnosis: Rotator Cuff tendinitis, right  · Treatment Diagnosis: Limited mobility and strength of right shoulder, pain and decreased function of right shoulder    Insurance/Certification information:  PT Insurance Information: [employer]  Physician Information:  Referring Practitioner: Dr. Mike Benites of care signed (Y/N):  Sent to Dr. Mee Mohamud 18    Visit# / total visits:    Pain level:   2/10     G-Code (if applicable):      Date / Visit # G-Code Applied:   visit  PT G-Codes  Functional Assessment Tool Used: Ziyad Nyers  Score: 32  Functional Limitation: Carrying, moving and handling objects  Carrying, Moving and Handling Objects Current Status (): At least 40 percent but less than 60 percent impaired, limited or restricted  Carrying, Moving and Handling Objects Goal Status (): At least 20 percent but less than 40 percent impaired, limited or restricted    Progress Note: [x]  Yes  [x]  No  Next due by: Visit #10      History of Injury: See below  Subjective:  Subjective  Subjective: Pt suffered no incident or injury, but patient is helping her parents with transfers and lifting wheelchairs, doing grocery shopping, laundry, etc and working. Pt  has noted steady loss of mobility and lingering pain. Pain started in right bicep and tricep and had migrated to the right shoulder and she wants to prevent surgery and worsening of pain. 18: Pt reports less shooting pain on shoulder and it was less painful until 6:00 pm the day of treatment.   18: Pt reports that she was feeling much better until she lifted a laundry [place]   [id number]  [phone]    [facility]  [address]  [place], [place]  [id number] [phone]       basket and helped to move scrap metal.  04/25/18 Patient reports shoulder has been more sore last few days for some reason. 4/30/18: Pt reports that she is better but not as well as she was before the flareup.  05/02/18: Patient reports pain intensity and frequency is decreasing. 5/7/18: Pt reports that her shoulder is improving , but she \"tweaked it\" last night pulling up her sheets. 5/9/18: Pt reports that the pain is still less than it was before, but she still has pain on lateral deltoid of right UE.  5/16/18 Pt reports her right shoulder is feeling better. Objective: See eval  Observation:   Test measurements:        Exercises:  Exercise/Equipment Resistance/Repetitions Other comments   ER/IR with green t band 3 x 10 each Advised to avoid pain or decrease range/reps if painful   T Slide Bilat shoulder Ext. Orange 3 x 10 Small rom tolerated well Try yellow band next rx   Cane ex - flex only 5 sec x 10 4/18/18: Scaption created pain so will hold on this. Continue with flexion    Rows Orange 3 x 10 Added   UBE 5 min wl 4 Added 4/30/18   Cane behind back for int rot 10X Added 5/7/18   Rhomboid/tricep stretch  Add                   Sleeper stretch 30\" x 4 5/16/18                          Other Therapeutic Activities:  Patient was educated on diagnosis, plan of care and prognosis of their complaint. Also, frequency and duration of treatments was discussed. Patient was informed of the attendance policy and issued a copy for their records. 4/23/18: Reviewed body mechanics, as pt lifts her parent's wheelchairs into the car. Home Exercise Program: Patient was given written instructions for home exercises as above. Patient performs them correctly and understands purpose. Gave green band to use at home.     Manual Treatments:    Crossfriction massage to biceps and STM over right upper arm   Stretch into IR  15 min,   a. (not applied due to skin irritation)    Modalities:    US x 8 min at 1/5 W/cm2 to right biceps area, CP to right upper arm x 10 min  Timed Code Treatment Minutes:    55  Total Treatment Minutes:    65    Treatment/Activity Tolerance:  [x] Patient tolerated treatment well [] Patient limited by fatigue  [] Patient limited by pain  [] Patient limited by other medical complications  [] Other:     Prognosis: [x] Good [] Fair  [] Poor    Goals:    Short term goals  Time Frame for Short term goals: 4 weeks  Short term goal 1: Pt will increase right shoulder ROM to nearly WNL  Short term goal 2: Pt will increase Strength to 4+/5 on right shoulder  Short term goal 3: Pt will note decreased pain, indicating healing of injury   Short term goal 4: Pt will be independent in HEP so she can avoid future injury              Patient Requires Follow-up: [x] Yes  [] No    Plan:   [] Continue per plan of care [] Alter current plan (see comments)  [x] Plan of care initiated [] Hold pending MD visit [] Discharge    Plan for Next Session: Add exercises. Will continue 4 more visits.     Electronically signed by:  Citlalli Chand, PT,

## 2019-05-07 RX ORDER — METFORMIN HYDROCHLORIDE 500 MG/1
TABLET, EXTENDED RELEASE ORAL
Qty: 120 TABLET | Refills: 5 | Status: SHIPPED | OUTPATIENT
Start: 2019-05-07 | End: 2019-07-29

## 2019-05-07 RX ORDER — PRAVASTATIN SODIUM 40 MG
TABLET ORAL
Qty: 30 TABLET | Refills: 5 | Status: SHIPPED | OUTPATIENT
Start: 2019-05-07 | End: 2019-12-19

## 2019-07-29 ENCOUNTER — OFFICE VISIT (OUTPATIENT)
Dept: FAMILY MEDICINE CLINIC | Age: 49
End: 2019-07-29
Payer: COMMERCIAL

## 2019-07-29 VITALS
TEMPERATURE: 97.7 F | HEIGHT: 66 IN | BODY MASS INDEX: 45.32 KG/M2 | SYSTOLIC BLOOD PRESSURE: 130 MMHG | OXYGEN SATURATION: 95 % | HEART RATE: 86 BPM | DIASTOLIC BLOOD PRESSURE: 76 MMHG | WEIGHT: 282 LBS

## 2019-07-29 DIAGNOSIS — E11.9 TYPE 2 DIABETES MELLITUS WITHOUT COMPLICATION, WITHOUT LONG-TERM CURRENT USE OF INSULIN (HCC): Primary | ICD-10-CM

## 2019-07-29 DIAGNOSIS — E78.00 PURE HYPERCHOLESTEROLEMIA: ICD-10-CM

## 2019-07-29 DIAGNOSIS — E66.01 OBESITY, MORBID, BMI 40.0-49.9 (HCC): ICD-10-CM

## 2019-07-29 LAB
A/G RATIO: 1.7 (ref 1.1–2.2)
ALBUMIN SERPL-MCNC: 3.8 G/DL (ref 3.4–5)
ALP BLD-CCNC: 94 U/L (ref 40–129)
ALT SERPL-CCNC: 16 U/L (ref 10–40)
ANION GAP SERPL CALCULATED.3IONS-SCNC: 15 MMOL/L (ref 3–16)
AST SERPL-CCNC: 14 U/L (ref 15–37)
BILIRUB SERPL-MCNC: 0.3 MG/DL (ref 0–1)
BUN BLDV-MCNC: 10 MG/DL (ref 7–20)
CALCIUM SERPL-MCNC: 9.1 MG/DL (ref 8.3–10.6)
CHLORIDE BLD-SCNC: 98 MMOL/L (ref 99–110)
CHOLESTEROL, TOTAL: 154 MG/DL (ref 0–199)
CO2: 26 MMOL/L (ref 21–32)
CREAT SERPL-MCNC: <0.5 MG/DL (ref 0.6–1.1)
GFR AFRICAN AMERICAN: >60
GFR NON-AFRICAN AMERICAN: >60
GLOBULIN: 2.3 G/DL
GLUCOSE BLD-MCNC: 160 MG/DL (ref 70–99)
HBA1C MFR BLD: 8 %
HDLC SERPL-MCNC: 45 MG/DL (ref 40–60)
LDL CHOLESTEROL CALCULATED: 90 MG/DL
POTASSIUM SERPL-SCNC: 4 MMOL/L (ref 3.5–5.1)
SODIUM BLD-SCNC: 139 MMOL/L (ref 136–145)
TOTAL PROTEIN: 6.1 G/DL (ref 6.4–8.2)
TRIGL SERPL-MCNC: 94 MG/DL (ref 0–150)
VLDLC SERPL CALC-MCNC: 19 MG/DL

## 2019-07-29 PROCEDURE — 99214 OFFICE O/P EST MOD 30 MIN: CPT | Performed by: FAMILY MEDICINE

## 2019-07-29 PROCEDURE — 83036 HEMOGLOBIN GLYCOSYLATED A1C: CPT | Performed by: FAMILY MEDICINE

## 2019-07-29 RX ORDER — DULAGLUTIDE 1.5 MG/.5ML
INJECTION, SOLUTION SUBCUTANEOUS
Qty: 2 ML | Refills: 3 | Status: SHIPPED | OUTPATIENT
Start: 2019-07-29 | End: 2019-11-14 | Stop reason: SDUPTHER

## 2019-07-29 ASSESSMENT — PATIENT HEALTH QUESTIONNAIRE - PHQ9
SUM OF ALL RESPONSES TO PHQ QUESTIONS 1-9: 1
SUM OF ALL RESPONSES TO PHQ9 QUESTIONS 1 & 2: 1
2. FEELING DOWN, DEPRESSED OR HOPELESS: 1
1. LITTLE INTEREST OR PLEASURE IN DOING THINGS: 0
SUM OF ALL RESPONSES TO PHQ QUESTIONS 1-9: 1

## 2019-10-21 DIAGNOSIS — E11.9 TYPE 2 DIABETES MELLITUS WITHOUT COMPLICATION, WITHOUT LONG-TERM CURRENT USE OF INSULIN (HCC): Primary | ICD-10-CM

## 2019-10-30 ENCOUNTER — OFFICE VISIT (OUTPATIENT)
Dept: FAMILY MEDICINE CLINIC | Age: 49
End: 2019-10-30
Payer: COMMERCIAL

## 2019-10-30 VITALS
HEIGHT: 66 IN | SYSTOLIC BLOOD PRESSURE: 124 MMHG | BODY MASS INDEX: 44.52 KG/M2 | OXYGEN SATURATION: 97 % | HEART RATE: 80 BPM | WEIGHT: 277 LBS | DIASTOLIC BLOOD PRESSURE: 88 MMHG

## 2019-10-30 DIAGNOSIS — E66.01 OBESITY, MORBID, BMI 40.0-49.9 (HCC): ICD-10-CM

## 2019-10-30 DIAGNOSIS — Z23 NEED FOR INFLUENZA VACCINATION: ICD-10-CM

## 2019-10-30 DIAGNOSIS — E11.9 TYPE 2 DIABETES MELLITUS WITHOUT COMPLICATION, WITHOUT LONG-TERM CURRENT USE OF INSULIN (HCC): Primary | ICD-10-CM

## 2019-10-30 LAB — HBA1C MFR BLD: 7.7 %

## 2019-10-30 PROCEDURE — 99213 OFFICE O/P EST LOW 20 MIN: CPT | Performed by: FAMILY MEDICINE

## 2019-10-30 PROCEDURE — 83036 HEMOGLOBIN GLYCOSYLATED A1C: CPT | Performed by: FAMILY MEDICINE

## 2019-10-30 PROCEDURE — 90471 IMMUNIZATION ADMIN: CPT | Performed by: FAMILY MEDICINE

## 2019-10-30 PROCEDURE — 90686 IIV4 VACC NO PRSV 0.5 ML IM: CPT | Performed by: FAMILY MEDICINE

## 2019-11-23 ENCOUNTER — OFFICE VISIT (OUTPATIENT)
Dept: FAMILY MEDICINE CLINIC | Age: 49
End: 2019-11-23
Payer: COMMERCIAL

## 2019-11-23 VITALS
DIASTOLIC BLOOD PRESSURE: 80 MMHG | BODY MASS INDEX: 44.68 KG/M2 | HEART RATE: 86 BPM | WEIGHT: 278 LBS | SYSTOLIC BLOOD PRESSURE: 134 MMHG | HEIGHT: 66 IN | RESPIRATION RATE: 16 BRPM

## 2019-11-23 DIAGNOSIS — S76.011A STRAIN OF RIGHT HIP, INITIAL ENCOUNTER: Primary | ICD-10-CM

## 2019-11-23 PROCEDURE — 99213 OFFICE O/P EST LOW 20 MIN: CPT | Performed by: FAMILY MEDICINE

## 2020-02-17 RX ORDER — PRAVASTATIN SODIUM 40 MG
TABLET ORAL
Qty: 30 TABLET | Refills: 0 | Status: SHIPPED | OUTPATIENT
Start: 2020-02-17 | End: 2020-03-18

## 2020-02-17 NOTE — TELEPHONE ENCOUNTER
I spoke with patient to advise she needs appointment. She states she is aware and she also needs to make appointments for her parents.  She will call back to schedule asap

## 2020-03-16 RX ORDER — DULAGLUTIDE 1.5 MG/.5ML
INJECTION, SOLUTION SUBCUTANEOUS
Qty: 2 ML | Refills: 1 | Status: SHIPPED | OUTPATIENT
Start: 2020-03-16 | End: 2020-05-06

## 2020-03-18 RX ORDER — GLIMEPIRIDE 2 MG/1
2 TABLET ORAL EVERY MORNING
Qty: 90 TABLET | Refills: 1 | Status: SHIPPED | OUTPATIENT
Start: 2020-03-18 | End: 2020-09-03

## 2020-03-18 RX ORDER — PRAVASTATIN SODIUM 40 MG
TABLET ORAL
Qty: 90 TABLET | Refills: 1 | Status: SHIPPED | OUTPATIENT
Start: 2020-03-18 | End: 2020-09-03

## 2020-05-06 RX ORDER — DULAGLUTIDE 1.5 MG/.5ML
INJECTION, SOLUTION SUBCUTANEOUS
Qty: 2 ML | Refills: 2 | Status: SHIPPED | OUTPATIENT
Start: 2020-05-06 | End: 2020-07-27

## 2020-05-11 ENCOUNTER — VIRTUAL VISIT (OUTPATIENT)
Dept: FAMILY MEDICINE CLINIC | Age: 50
End: 2020-05-11
Payer: COMMERCIAL

## 2020-05-11 VITALS
HEART RATE: 90 BPM | DIASTOLIC BLOOD PRESSURE: 77 MMHG | TEMPERATURE: 97.4 F | SYSTOLIC BLOOD PRESSURE: 125 MMHG | OXYGEN SATURATION: 97 %

## 2020-05-11 PROCEDURE — 99214 OFFICE O/P EST MOD 30 MIN: CPT | Performed by: FAMILY MEDICINE

## 2020-05-11 ASSESSMENT — PATIENT HEALTH QUESTIONNAIRE - PHQ9
1. LITTLE INTEREST OR PLEASURE IN DOING THINGS: 0
2. FEELING DOWN, DEPRESSED OR HOPELESS: 0
SUM OF ALL RESPONSES TO PHQ9 QUESTIONS 1 & 2: 0
SUM OF ALL RESPONSES TO PHQ QUESTIONS 1-9: 0
SUM OF ALL RESPONSES TO PHQ QUESTIONS 1-9: 0

## 2020-05-11 NOTE — PROGRESS NOTES
baseline. Psychiatric:         Mood and Affect: Mood normal.         Behavior: Behavior normal.         Thought Content: Thought content normal.         Judgment: Judgment normal.          ASSESSMENT/PLAN:    1. Type 2 diabetes mellitus without complication, without long-term current use of insulin (HCC)  - control unknown. Assess with a1c. Will adjust meds after a1c back if not under 7.0. We discussed that walking even a short distance every day (for 10-15 min) can help overal sugar control. Continue to plan meals, limit carbs, control calories. Encouraged to montior weight also- appears to be losing weight, but today's weight is not on our office scale. - Microalbumin / Creatinine Urine Ratio; Future  - Hemoglobin A1C; Future    2. Pure hypercholesterolemia  - continue statin therapy due to diabetes  - Lipid Panel; Future  - Comprehensive Metabolic Panel; Future    3. Vitamin D deficiency  - encouraged to restart vitD/ca supplement daily. 4. Obesity, morbid, BMI 40.0-49.9 ([address]  - As above; offered contratulatinos for weight loss and encouraged to keep up the good work. 5. Screening mammogram, encounter for  - will mail order to her:  - NII DIGITAL SCREEN W CAD BILATERAL; Future      Return in about 4 months (around 9/11/2020) for follow up diabetesIlan Beltrán is a 52 y.o. female being evaluated by a Virtual Visit (video visit) encounter to address concerns as mentioned above. A caregiver was present when appropriate. Due to this being a TeleHealth encounter (During HQBNX-32 public health emergency), evaluation of the following organ systems was limited: Vitals/Constitutional/EENT/Resp/CV/GI//MS/Neuro/Skin/Heme-Lymph-Imm.   Pursuant to the emergency declaration under the [facility] authority and the [employer] and [employer] Act, this Virtual Visit was conducted with patient's (and/or legal guardian's)

## 2020-05-14 DIAGNOSIS — E78.00 PURE HYPERCHOLESTEROLEMIA: ICD-10-CM

## 2020-05-14 DIAGNOSIS — E11.9 TYPE 2 DIABETES MELLITUS WITHOUT COMPLICATION, WITHOUT LONG-TERM CURRENT USE OF INSULIN (HCC): ICD-10-CM

## 2020-05-14 LAB
A/G RATIO: 1.5 (ref 1.1–2.2)
ALBUMIN SERPL-MCNC: 3.7 G/DL (ref 3.4–5)
ALP BLD-CCNC: 89 U/L (ref 40–129)
ALT SERPL-CCNC: 10 U/L (ref 10–40)
ANION GAP SERPL CALCULATED.3IONS-SCNC: 13 MMOL/L (ref 3–16)
AST SERPL-CCNC: 9 U/L (ref 15–37)
BILIRUB SERPL-MCNC: 0.3 MG/DL (ref 0–1)
BUN BLDV-MCNC: 10 MG/DL (ref 7–20)
CALCIUM SERPL-MCNC: 9 MG/DL (ref 8.3–10.6)
CHLORIDE BLD-SCNC: 102 MMOL/L (ref 99–110)
CHOLESTEROL, TOTAL: 148 MG/DL (ref 0–199)
CO2: 25 MMOL/L (ref 21–32)
CREAT SERPL-MCNC: <0.5 MG/DL (ref 0.6–1.1)
CREATININE URINE: 163.1 MG/DL (ref 28–259)
GFR AFRICAN AMERICAN: >60
GFR NON-AFRICAN AMERICAN: >60
GLOBULIN: 2.5 G/DL
GLUCOSE BLD-MCNC: 117 MG/DL (ref 70–99)
HDLC SERPL-MCNC: 49 MG/DL (ref 40–60)
LDL CHOLESTEROL CALCULATED: 84 MG/DL
MICROALBUMIN UR-MCNC: 2.3 MG/DL
MICROALBUMIN/CREAT UR-RTO: 14.1 MG/G (ref 0–30)
POTASSIUM SERPL-SCNC: 4.2 MMOL/L (ref 3.5–5.1)
SODIUM BLD-SCNC: 140 MMOL/L (ref 136–145)
TOTAL PROTEIN: 6.2 G/DL (ref 6.4–8.2)
TRIGL SERPL-MCNC: 77 MG/DL (ref 0–150)
VLDLC SERPL CALC-MCNC: 15 MG/DL

## 2020-05-15 LAB
ESTIMATED AVERAGE GLUCOSE: 148.5 MG/DL
HBA1C MFR BLD: 6.8 %

## 2020-07-27 RX ORDER — DULAGLUTIDE 1.5 MG/.5ML
INJECTION, SOLUTION SUBCUTANEOUS
Qty: 2 ML | Refills: 2 | Status: SHIPPED | OUTPATIENT
Start: 2020-07-27 | End: 2020-11-02

## 2020-09-03 RX ORDER — PRAVASTATIN SODIUM 40 MG
TABLET ORAL
Qty: 90 TABLET | Refills: 1 | Status: SHIPPED | OUTPATIENT
Start: 2020-09-03 | End: 2021-03-01

## 2020-09-03 RX ORDER — GLIMEPIRIDE 2 MG/1
2 TABLET ORAL EVERY MORNING
Qty: 90 TABLET | Refills: 1 | Status: SHIPPED | OUTPATIENT
Start: 2020-09-03 | End: 2021-03-01

## 2020-09-21 ENCOUNTER — TELEMEDICINE (OUTPATIENT)
Dept: FAMILY MEDICINE CLINIC | Age: 50
End: 2020-09-21
Payer: COMMERCIAL

## 2020-09-21 PROCEDURE — 99214 OFFICE O/P EST MOD 30 MIN: CPT | Performed by: FAMILY MEDICINE

## 2020-09-21 NOTE — PROGRESS NOTES
Taking? Authorizing Provider   glimepiride (AMARYL) 2 MG tablet TAKE 1 TABLET BY MOUTH EVERY MORNING Yes Long Ashford MD   pravastatin (PRAVACHOL) 40 MG tablet TAKE 1 TABLET BY MOUTH EVERY EVENING Yes Long Ashford MD   TRULICITY 1.5 GB/8.4ZZ SOPN ADMINISTER 0.5 ML(1.5 MG) UNDER THE SKIN 1 TIME A WEEK Yes Long Ashford MD   metFORMIN (GLUCOPHAGE) 1000 MG tablet TAKE 1 TABLET BY MOUTH TWICE DAILY WITH MEALS Yes Long Ashford MD   Blood Glucose Monitoring Suppl (TRUE METRIX METER) SAJI 1 Device by Does not apply route daily Yes LUCIANO Hawkins CNP   glucose blood VI test strips (TRUE METRIX BLOOD GLUCOSE TEST) strip 1 each by In Vitro route daily As needed. Yes LUCIANO Hawkins CNP   FREESTYLE LANCETS MISC TEST DAILY Yes Long Ashford MD   FREESTYLE LITE strip TEST EVERY DAY Yes Long Ashford MD   glucose monitoring kit (FREESTYLE) monitoring kit 1 kit by Does not apply route daily as needed Yes Long Ashford MD       Social History     Tobacco Use    Smoking status: Former Smoker     Last attempt to quit: 2007     Years since quittin.7    Smokeless tobacco: Never Used   Substance Use Topics    Alcohol use: Yes     Alcohol/week: 0.0 standard drinks     Comment: rarely    Drug use: No            PHYSICAL EXAMINATION:  Physical Exam  Constitutional:       General: She is not in acute distress. Appearance: Normal appearance. She is not ill-appearing. Pulmonary:      Effort: Pulmonary effort is normal.   Skin:     General: Skin is warm. Neurological:      General: No focal deficit present. Mental Status: She is alert and oriented to person, place, and time. Mental status is at baseline. Psychiatric:         Mood and Affect: Mood normal.         Behavior: Behavior normal.         Thought Content: Thought content normal.         Judgment: Judgment normal.          ASSESSMENT/PLAN:    1.  Type 2 diabetes mellitus without was used to authenticate this note.

## 2020-11-02 RX ORDER — DULAGLUTIDE 1.5 MG/.5ML
INJECTION, SOLUTION SUBCUTANEOUS
Qty: 2 ML | Refills: 3 | Status: SHIPPED | OUTPATIENT
Start: 2020-11-02 | End: 2021-02-15

## 2021-02-15 RX ORDER — DULAGLUTIDE 1.5 MG/.5ML
INJECTION, SOLUTION SUBCUTANEOUS
Qty: 2 ML | Refills: 3 | Status: SHIPPED | OUTPATIENT
Start: 2021-02-15 | End: 2021-06-14

## 2021-03-01 RX ORDER — PRAVASTATIN SODIUM 40 MG
TABLET ORAL
Qty: 90 TABLET | Refills: 1 | Status: SHIPPED | OUTPATIENT
Start: 2021-03-01 | End: 2021-04-05 | Stop reason: ALTCHOICE

## 2021-03-01 RX ORDER — GLIMEPIRIDE 2 MG/1
2 TABLET ORAL EVERY MORNING
Qty: 90 TABLET | Refills: 1 | Status: SHIPPED | OUTPATIENT
Start: 2021-03-01 | End: 2021-10-18

## 2021-03-29 DIAGNOSIS — E11.9 TYPE 2 DIABETES MELLITUS WITHOUT COMPLICATION, WITHOUT LONG-TERM CURRENT USE OF INSULIN (HCC): ICD-10-CM

## 2021-04-05 ENCOUNTER — OFFICE VISIT (OUTPATIENT)
Dept: FAMILY MEDICINE CLINIC | Age: 51
End: 2021-04-05
Payer: COMMERCIAL

## 2021-04-05 VITALS
HEART RATE: 80 BPM | TEMPERATURE: 97.8 F | BODY MASS INDEX: 45.48 KG/M2 | HEIGHT: 66 IN | DIASTOLIC BLOOD PRESSURE: 82 MMHG | SYSTOLIC BLOOD PRESSURE: 126 MMHG | OXYGEN SATURATION: 97 % | WEIGHT: 283 LBS

## 2021-04-05 DIAGNOSIS — E11.9 TYPE 2 DIABETES MELLITUS WITHOUT COMPLICATION, WITHOUT LONG-TERM CURRENT USE OF INSULIN (HCC): Primary | ICD-10-CM

## 2021-04-05 DIAGNOSIS — S86.111A STRAIN OF RIGHT GASTROCNEMIUS MUSCLE, INITIAL ENCOUNTER: ICD-10-CM

## 2021-04-05 DIAGNOSIS — E78.00 PURE HYPERCHOLESTEROLEMIA: ICD-10-CM

## 2021-04-05 DIAGNOSIS — E66.01 OBESITY, MORBID, BMI 40.0-49.9 (HCC): ICD-10-CM

## 2021-04-05 DIAGNOSIS — B35.1 TOENAIL FUNGUS: ICD-10-CM

## 2021-04-05 LAB — HBA1C MFR BLD: 8 %

## 2021-04-05 PROCEDURE — 99214 OFFICE O/P EST MOD 30 MIN: CPT | Performed by: FAMILY MEDICINE

## 2021-04-05 PROCEDURE — 83036 HEMOGLOBIN GLYCOSYLATED A1C: CPT | Performed by: FAMILY MEDICINE

## 2021-04-05 PROCEDURE — 3052F HG A1C>EQUAL 8.0%<EQUAL 9.0%: CPT | Performed by: FAMILY MEDICINE

## 2021-04-05 RX ORDER — TERBINAFINE HYDROCHLORIDE 250 MG/1
250 TABLET ORAL DAILY
Qty: 84 TABLET | Refills: 0 | Status: CANCELLED | OUTPATIENT
Start: 2021-04-05 | End: 2021-06-28

## 2021-04-05 RX ORDER — ROSUVASTATIN CALCIUM 20 MG/1
20 TABLET, COATED ORAL DAILY
Qty: 90 TABLET | Refills: 1 | Status: SHIPPED | OUTPATIENT
Start: 2021-04-05 | End: 2021-10-06

## 2021-04-05 ASSESSMENT — PATIENT HEALTH QUESTIONNAIRE - PHQ9
1. LITTLE INTEREST OR PLEASURE IN DOING THINGS: 0
2. FEELING DOWN, DEPRESSED OR HOPELESS: 1
SUM OF ALL RESPONSES TO PHQ QUESTIONS 1-9: 1
SUM OF ALL RESPONSES TO PHQ9 QUESTIONS 1 & 2: 1

## 2021-04-05 ASSESSMENT — ENCOUNTER SYMPTOMS
DIARRHEA: 0
CHEST TIGHTNESS: 0
CONSTIPATION: 0
SHORTNESS OF BREATH: 0
VOMITING: 0

## 2021-04-05 NOTE — PROGRESS NOTES
2021    Blood pressure 126/82, pulse 80, temperature 97.8 °F (36.6 °C), temperature source Temporal, height 5' 6\" (1.676 m), weight 283 lb (128.4 kg), last menstrual period 2021, SpO2 97 %, not currently breastfeeding. Elder Elizondo (:  1970) is a 54 y.o. female, here for evaluation of the following medical concerns:    Chief Complaint   Patient presents with    Diabetes     f/u dm     DM2  A1C today is 8.0%, previously was 6.8%   Rarely checks BS at home   In March, missed about half the dosage of Metformin and Amaryl  Complied with taking Trulicity weekly   Busy with caring for her parents - but consistently been taking them for 1 week    - 238 the past few months, has been checking them randomly throughout the day  Now has supplies and medications at parents house  BS today 155 at [address] - eats a lot of fried foods and more carbs, binge eating once a month, some sweets. Cutting down on the sweets, working on growing her on vegetables  Does not cook with salt at all. Drinks some pop, fruit juices, and water  Tends to eat more when she is stressed and doesn't have much time for herself  Doesn't get much help from siblings since they live out of town  Exercise - not much, taking care of parents   No drinking or smoking    Toe fungus   Noticed nail thickening in and black area  Uses an OTC antifungal barrier cream and is helping  New nail is growing out    Diabetic Neuropathy   Bilateral, mild tingling sensations  More bothersome and annoying  Also has plantar fascitis      Taking Pravastatin (40mg) for primary prevention. Denies GI upset or myalgias     Right sharp knee pain with getting up from sitting position, on the backside of knee - has been going on for months. Has to go up one step at a time, has pain with bending knee. Transfer her dad and twists the right knee. Has swelling and stiffness.         Patient Active Problem List   Diagnosis    Allergic rhinitis    Vitamin D deficiency    Type 2 diabetes mellitus without complication ([facility] dx .  Obesity, morbid, BMI 40.0-49.9 ([facility]    Pure hypercholesterolemia        Body mass index is 45.68 kg/m². Wt Readings from Last 3 Encounters:   21 283 lb (128.4 kg)   19 278 lb (126.1 kg)   10/30/19 277 lb (125.6 kg)       BP Readings from Last 3 Encounters:   21 126/82   20 125/77   19 134/80       Allergies   Allergen Reactions    Tetracyclines & Related        Prior to Visit Medications    Medication Sig Taking? Authorizing Provider   metFORMIN (GLUCOPHAGE) 1000 MG tablet TAKE 1 TABLET BY MOUTH TWICE DAILY WITH MEALS Yes Melody Rivera MD   pravastatin (PRAVACHOL) 40 MG tablet TAKE 1 TABLET BY MOUTH EVERY EVENING Yes Melody Rivera MD   glimepiride (AMARYL) 2 MG tablet TAKE 1 TABLET BY MOUTH EVERY MORNING Yes Melody Rivera MD   TRULICITY 1.5 EE/5.1QL SOPN ADMINISTER 0.5 ML(1.5 MG) UNDER THE SKIN 1 TIME A WEEK Yes Melody Rivera MD   Blood Glucose Monitoring Suppl (TRUE METRIX METER) SAJI 1 Device by Does not apply route daily Yes LUCIANO Gardiner CNP   glucose blood VI test strips (TRUE METRIX BLOOD GLUCOSE TEST) strip 1 each by In Vitro route daily As needed. Yes LUCIANO Gardiner CNP   FREESTYLE LANCETS MISC TEST DAILY Yes Melody Rivera MD   FREESTYLE LITE strip TEST EVERY DAY Yes Melody Rivera MD   glucose monitoring kit (FREESTYLE) monitoring kit 1 kit by Does not apply route daily as needed Yes Melody Rivera MD        Social History     Tobacco Use    Smoking status: Former Smoker     Quit date: 2007     Years since quittin.2    Smokeless tobacco: Never Used   Substance Use Topics    Alcohol use: Yes     Alcohol/week: 0.0 standard drinks     Comment: rarely    Drug use: No       Review of Systems   Constitutional: Positive for appetite change. Negative for activity change.    Respiratory: Negative for chest tightness and shortness of breath. Cardiovascular: Negative for chest pain and palpitations. Gastrointestinal: Negative for constipation, diarrhea and vomiting. Physical Exam  Constitutional:       Appearance: Normal appearance. HENT:      Head: Normocephalic and atraumatic. Eyes:      Comments: Xanthomas present on both eyes   Neck:      Musculoskeletal: Neck supple. Cardiovascular:      Rate and Rhythm: Normal rate and regular rhythm. Pulses: Normal pulses. Heart sounds: Normal heart sounds. Pulmonary:      Effort: Pulmonary effort is normal.      Breath sounds: Normal breath sounds. Musculoskeletal:         General: Tenderness present. Comments: Pain present on R plantar flexion with resistance. Tenderness with palpation of gastrocnemius muscle   Neurological:      Mental Status: She is alert and oriented to person, place, and time. Psychiatric:         Mood and Affect: Mood normal.         Behavior: Behavior normal.         Thought Content: Thought content normal.         Judgment: Judgment normal.         ASSESSMENT/PLAN:    1. Type 2 diabetes mellitus without complication, without long-term current use of insulin (HCC)  A1C is 8.0%, previously was 6.8%  Has been inconsistent with taking metformin (1000mg) and amaryl (2mg)- she will work harder to be consistent with meds. Continue Trulicity at 1.5 mg dose    We discussed her life stress, mainly being the chief caregiver for her ill parents- both non-ambulatory. Getting more help with them will help reduce stress and thus stress eating, hopefully.    - COMPREHENSIVE METABOLIC PANEL; Future  - MICROALBUMIN / CREATININE URINE RATIO; Future  - POCT glycosylated hemoglobin (Hb A1C)    2.  Pure hypercholesterolemia  Last lipid panel in May 2020 was normal and initially was started on statin therapy for diabetes  Xanthomas noted on eyelids and concerned with the appearance of them  Discontinued pravastatin (40mg) and will start a high-intensity statin (crestor 20)  - Lipid, Fasting; Future    3. Obesity, morbid, BMI 40.0-49.9 ([address]  Advised to monitor weight and encouraged patient to take time for herself as she is busy caring for her parents medical needs full time  Tends to stress eat and does not exercise  Plans to do better this summer with watching portion sizes   Refuses to talk to psych at this time about stressors    4. Toenail fungus  Nail thickening and discoloration on left great toe  Fungus has improved some with daily use of OTC antifungal cream  New healthy nail is growing out    5. Strain of right gastrocnemius muscle, initial encounter  Encouraged patient to use warm compresses and to do calf stretching exercises for this. Follow up: 3 mon for diabetes    An  electronicsignature was used to authenticate this note.     --Ricco Vazquez MD on 4/6/2021  at 7:49 AM

## 2021-04-06 ENCOUNTER — IMMUNIZATION (OUTPATIENT)
Dept: FAMILY MEDICINE CLINIC | Age: 51
End: 2021-04-06
Payer: COMMERCIAL

## 2021-04-08 PROCEDURE — 0001A COVID-19, PFIZER VACCINE 30MCG/0.3ML DOSE: CPT | Performed by: FAMILY MEDICINE

## 2021-04-08 PROCEDURE — 91300 COVID-19, PFIZER VACCINE 30MCG/0.3ML DOSE: CPT | Performed by: FAMILY MEDICINE

## 2021-04-12 DIAGNOSIS — E11.9 TYPE 2 DIABETES MELLITUS WITHOUT COMPLICATION, WITHOUT LONG-TERM CURRENT USE OF INSULIN (HCC): ICD-10-CM

## 2021-04-12 DIAGNOSIS — E78.00 PURE HYPERCHOLESTEROLEMIA: ICD-10-CM

## 2021-04-12 LAB
A/G RATIO: 1.5 (ref 1.1–2.2)
ALBUMIN SERPL-MCNC: 4.2 G/DL (ref 3.4–5)
ALP BLD-CCNC: 106 U/L (ref 40–129)
ALT SERPL-CCNC: 17 U/L (ref 10–40)
ANION GAP SERPL CALCULATED.3IONS-SCNC: 10 MMOL/L (ref 3–16)
AST SERPL-CCNC: 14 U/L (ref 15–37)
BILIRUB SERPL-MCNC: 0.3 MG/DL (ref 0–1)
BUN BLDV-MCNC: 14 MG/DL (ref 7–20)
CALCIUM SERPL-MCNC: 9.3 MG/DL (ref 8.3–10.6)
CHLORIDE BLD-SCNC: 103 MMOL/L (ref 99–110)
CHOLESTEROL, FASTING: 182 MG/DL (ref 0–199)
CO2: 28 MMOL/L (ref 21–32)
CREAT SERPL-MCNC: 0.6 MG/DL (ref 0.6–1.1)
CREATININE URINE: 328.9 MG/DL (ref 28–259)
GFR AFRICAN AMERICAN: >60
GFR NON-AFRICAN AMERICAN: >60
GLOBULIN: 2.8 G/DL
GLUCOSE BLD-MCNC: 148 MG/DL (ref 70–99)
HDLC SERPL-MCNC: 43 MG/DL (ref 40–60)
LDL CHOLESTEROL CALCULATED: 124 MG/DL
MICROALBUMIN UR-MCNC: 3.3 MG/DL
MICROALBUMIN/CREAT UR-RTO: 10 MG/G (ref 0–30)
POTASSIUM SERPL-SCNC: 4.4 MMOL/L (ref 3.5–5.1)
SODIUM BLD-SCNC: 141 MMOL/L (ref 136–145)
TOTAL PROTEIN: 7 G/DL (ref 6.4–8.2)
TRIGLYCERIDE, FASTING: 77 MG/DL (ref 0–150)
VLDLC SERPL CALC-MCNC: 15 MG/DL

## 2021-04-27 ENCOUNTER — IMMUNIZATION (OUTPATIENT)
Dept: FAMILY MEDICINE CLINIC | Age: 51
End: 2021-04-27
Payer: COMMERCIAL

## 2021-04-27 PROCEDURE — 0002A COVID-19, PFIZER VACCINE 30MCG/0.3ML DOSE: CPT | Performed by: FAMILY MEDICINE

## 2021-04-27 PROCEDURE — 91300 COVID-19, PFIZER VACCINE 30MCG/0.3ML DOSE: CPT | Performed by: FAMILY MEDICINE

## 2021-07-12 DIAGNOSIS — E11.9 TYPE 2 DIABETES MELLITUS WITHOUT COMPLICATION, WITHOUT LONG-TERM CURRENT USE OF INSULIN (HCC): ICD-10-CM

## 2021-07-19 ENCOUNTER — OFFICE VISIT (OUTPATIENT)
Dept: FAMILY MEDICINE CLINIC | Age: 51
End: 2021-07-19
Payer: COMMERCIAL

## 2021-07-19 VITALS
DIASTOLIC BLOOD PRESSURE: 82 MMHG | HEIGHT: 66 IN | SYSTOLIC BLOOD PRESSURE: 130 MMHG | OXYGEN SATURATION: 97 % | WEIGHT: 283 LBS | BODY MASS INDEX: 45.48 KG/M2 | HEART RATE: 78 BPM

## 2021-07-19 DIAGNOSIS — H92.01 OTALGIA, RIGHT: ICD-10-CM

## 2021-07-19 DIAGNOSIS — E11.9 TYPE 2 DIABETES MELLITUS WITHOUT COMPLICATION, WITHOUT LONG-TERM CURRENT USE OF INSULIN (HCC): Primary | ICD-10-CM

## 2021-07-19 DIAGNOSIS — Z11.59 ENCOUNTER FOR HEPATITIS C VIRUS SCREENING TEST FOR HIGH RISK PATIENT: ICD-10-CM

## 2021-07-19 DIAGNOSIS — E66.01 OBESITY, MORBID, BMI 40.0-49.9 (HCC): ICD-10-CM

## 2021-07-19 DIAGNOSIS — Z12.11 SCREEN FOR COLON CANCER: ICD-10-CM

## 2021-07-19 DIAGNOSIS — Z91.89 ENCOUNTER FOR HEPATITIS C VIRUS SCREENING TEST FOR HIGH RISK PATIENT: ICD-10-CM

## 2021-07-19 LAB — HBA1C MFR BLD: 8 %

## 2021-07-19 PROCEDURE — 69210 REMOVE IMPACTED EAR WAX UNI: CPT | Performed by: FAMILY MEDICINE

## 2021-07-19 PROCEDURE — 83036 HEMOGLOBIN GLYCOSYLATED A1C: CPT | Performed by: FAMILY MEDICINE

## 2021-07-19 PROCEDURE — 99214 OFFICE O/P EST MOD 30 MIN: CPT | Performed by: FAMILY MEDICINE

## 2021-07-19 PROCEDURE — 3052F HG A1C>EQUAL 8.0%<EQUAL 9.0%: CPT | Performed by: FAMILY MEDICINE

## 2021-07-19 RX ORDER — DULAGLUTIDE 3 MG/.5ML
3 INJECTION, SOLUTION SUBCUTANEOUS WEEKLY
Qty: 4 PEN | Refills: 5 | Status: SHIPPED | OUTPATIENT
Start: 2021-07-19 | End: 2022-01-06

## 2021-07-19 NOTE — PROGRESS NOTES
2021    Blood pressure 130/82, pulse 78, height 5' 6\" (1.676 m), weight 283 lb (128.4 kg), SpO2 97 %, not currently breastfeeding. Whitney Linder (:  1970) is a 54 y.o. female, here for evaluation of the following medical concerns:    Chief Complaint   Patient presents with    Follow-up     f/u DM check     R otalgia x a few days. Feels like 'swimmer's ear'  Last pool use was: hot tub use 4x in past couple weeks. No discharge  No hearing loss  No tinnitus  Hurts to use q tip    DM-2: no complications. Currently on metformin 2 gm/d, trulicity 1.5, amaryl 2. Her a1c was 8 last time- but was related to inconsistent med use. Says much more consistent now. FBG's: usually <150.   a1c today is 8.0  Weight stable. She has back pain and is doing some exercising in the hot tub now (at uncle's house.)  No foot numbness/tingling. Lab Results   Component Value Date    LABA1C 8.0 2021    LABA1C 6.8 2020    LABA1C 7.7 10/30/2019      Last renal function test:   Lab Results   Component Value Date     2021    K 4.4 2021    BUN 14 2021    CREATININE 0.6 2021     Estimated Creatinine Clearance: 156 mL/min (based on SCr of 0.6 mg/dL). Lab Results   Component Value Date    CHOL 148 2020    TRIG 77 2020    HDL 43 2021    LDLCALC 124 (H) 2021     Lab Results   Component Value Date    ALT 17 2021    AST 14 (L) 2021        Is fasting for recheck. She takes crestor for primary prevention. Patient Active Problem List   Diagnosis    Allergic rhinitis    Vitamin D deficiency    Type 2 diabetes mellitus without complication ([address] dx .  Obesity, morbid, BMI 40.0-49.9 ([address]    Pure hypercholesterolemia        Body mass index is 45.68 kg/m².     Wt Readings from Last 3 Encounters:   21 283 lb (128.4 kg)   21 283 lb (128.4 kg)   19 278 lb (126.1 kg)       BP Readings from Last 3 Encounters:   21 130/82 21 126/82   20 125/77       Allergies   Allergen Reactions    Tetracyclines & Related        Prior to Visit Medications    Medication Sig Taking? Authorizing Provider   metFORMIN (GLUCOPHAGE) 1000 MG tablet TAKE 1 TABLET BY MOUTH TWICE DAILY WITH MEALS Yes Mariaa Razo MD   TRULICITY 1.5 JY/7.2LA SOPN ADMINISTER 0.5 ML(1.5 MG) UNDER THE SKIN 1 TIME A WEEK Yes Mariaa Razo MD   rosuvastatin (CRESTOR) 20 MG tablet Take 1 tablet by mouth daily Yes Mariaa Razo MD   glimepiride (AMARYL) 2 MG tablet TAKE 1 TABLET BY MOUTH EVERY MORNING Yes Mariaa Razo MD   Blood Glucose Monitoring Suppl (TRUE METRIX METER) SAJI 1 Device by Does not apply route daily Yes LUCIANO Smith CNP   glucose blood VI test strips (TRUE METRIX BLOOD GLUCOSE TEST) strip 1 each by In Vitro route daily As needed. Yes LUCIANO Smith CNP   FREESTYLE LANCETS MISC TEST DAILY Yes Mariaa Razo MD   FREESTYLE LITE strip TEST EVERY DAY Yes Mariaa Razo MD   glucose monitoring kit (FREESTYLE) monitoring kit 1 kit by Does not apply route daily as needed Yes Mariaa Razo MD        Social History     Tobacco Use    Smoking status: Former Smoker     Quit date: 2007     Years since quittin.5    Smokeless tobacco: Never Used   Substance Use Topics    Alcohol use: Yes     Alcohol/week: 0.0 standard drinks     Comment: rarely    Drug use: No       Review of Systems No chest pains, dizziness, heart palpitations, dyspnea, lightheadedness, worsening edema. Physical Exam  Vitals and nursing note reviewed. Constitutional:       Appearance: Normal appearance. She is well-developed. HENT:      Right Ear: Tympanic membrane, ear canal and external ear normal. There is no impacted cerumen. Left Ear: Tympanic membrane, ear canal and external ear normal. There is no impacted cerumen.       Ears:      Comments: R TM has a small piece of cerumen distal in canal that may be abutting the TM and causing pain. Neck:      Thyroid: No thyromegaly. Vascular: No carotid bruit. Cardiovascular:      Rate and Rhythm: Normal rate and regular rhythm. Pulses: Normal pulses. Heart sounds: Normal heart sounds. No murmur heard. Pulmonary:      Effort: Pulmonary effort is normal. No respiratory distress. Breath sounds: Normal breath sounds. No wheezing or rales. Musculoskeletal:         General: Normal range of motion. Cervical back: Normal range of motion and neck supple. Right lower leg: No edema. Left lower leg: No edema. Comments: Feet: no lesions or significant deformity. Sensation intact to SW monofilament and vibratory sense bilaterally; intact pedal pulses and tissue perfusion. Skin:     General: Skin is warm and dry. Neurological:      General: No focal deficit present. Mental Status: She is alert and oriented to person, place, and time. Mental status is at baseline. Psychiatric:         Mood and Affect: Mood normal.         Behavior: Behavior normal.         Thought Content: Thought content normal.         Judgment: Judgment normal.         ASSESSMENT/PLAN:    1. Type 2 diabetes mellitus without complication, without long-term current use of insulin ([facility])  - a1c still high: 8.0.  incr Trulicity to 3 mg/d.  - Encouraged compliance with diet, exercise, yearly eye exams for retinopathy, daily foot care/observation.   - POCT glycosylated hemoglobin (Hb A1C)  -  DIABETES FOOT EXAM    2. Obesity, morbid, BMI 40.0-49.9 ([facility]  The patient is asked to make an attempt to improve diet and exercise patterns to aid in medical management of this problem. Has had many challenges to this in her life as she cares for her parents. 3. Encounter for hepatitis C virus screening test for high risk patient  - Hepatitis C Antibody; Future    4.  Screen for colon cancer  - Discussed various screening strategies for colon cancer; patient opts for the FIT test.  Test kit ordered and given today. - POCT Fecal Immunochemical Test (FIT); Future    5. Otalgia, right  - water irrigation used to remove the impacted piece of cerumen with resolution of her symptoms. - [id number] - AL REMOVE IMPACTED EAR WAX      Follow up: 3 mo for diabetes check. An  [employer]ature was used to authenticate this note.     --Jeanette Fernandez MD on 7/19/2021 at 1:13 PM

## 2021-08-12 DIAGNOSIS — Z12.11 SCREEN FOR COLON CANCER: ICD-10-CM

## 2021-08-12 LAB
CONTROL: POSITIVE
HEMOCCULT STL QL: NEGATIVE

## 2021-08-12 PROCEDURE — 82274 ASSAY TEST FOR BLOOD FECAL: CPT | Performed by: FAMILY MEDICINE

## 2021-10-06 RX ORDER — ROSUVASTATIN CALCIUM 20 MG/1
20 TABLET, COATED ORAL DAILY
Qty: 90 TABLET | Refills: 1 | Status: SHIPPED | OUTPATIENT
Start: 2021-10-06 | End: 2022-04-11

## 2021-10-18 ENCOUNTER — OFFICE VISIT (OUTPATIENT)
Dept: FAMILY MEDICINE CLINIC | Age: 51
End: 2021-10-18
Payer: COMMERCIAL

## 2021-10-18 VITALS
HEART RATE: 85 BPM | BODY MASS INDEX: 45 KG/M2 | OXYGEN SATURATION: 97 % | DIASTOLIC BLOOD PRESSURE: 82 MMHG | HEIGHT: 66 IN | WEIGHT: 280 LBS | SYSTOLIC BLOOD PRESSURE: 124 MMHG

## 2021-10-18 DIAGNOSIS — E11.9 TYPE 2 DIABETES MELLITUS WITHOUT COMPLICATION, WITHOUT LONG-TERM CURRENT USE OF INSULIN (HCC): Primary | ICD-10-CM

## 2021-10-18 DIAGNOSIS — E66.01 OBESITY, MORBID, BMI 40.0-49.9 (HCC): ICD-10-CM

## 2021-10-18 DIAGNOSIS — R13.12 OROPHARYNGEAL DYSPHAGIA: ICD-10-CM

## 2021-10-18 DIAGNOSIS — N30.01 ACUTE CYSTITIS WITH HEMATURIA: ICD-10-CM

## 2021-10-18 LAB
BILIRUBIN, POC: ABNORMAL
BLOOD URINE, POC: ABNORMAL
CLARITY, POC: CLEAR
COLOR, POC: YELLOW
GLUCOSE URINE, POC: ABNORMAL
HBA1C MFR BLD: 7.9 %
KETONES, POC: ABNORMAL
LEUKOCYTE EST, POC: POSITIVE
NITRITE, POC: POSITIVE
PH, POC: 5.5
PROTEIN, POC: 100
SPECIFIC GRAVITY, POC: >1.03
UROBILINOGEN, POC: 0.2

## 2021-10-18 PROCEDURE — 3051F HG A1C>EQUAL 7.0%<8.0%: CPT | Performed by: FAMILY MEDICINE

## 2021-10-18 PROCEDURE — 81002 URINALYSIS NONAUTO W/O SCOPE: CPT | Performed by: FAMILY MEDICINE

## 2021-10-18 PROCEDURE — 83036 HEMOGLOBIN GLYCOSYLATED A1C: CPT | Performed by: FAMILY MEDICINE

## 2021-10-18 PROCEDURE — 99214 OFFICE O/P EST MOD 30 MIN: CPT | Performed by: FAMILY MEDICINE

## 2021-10-18 RX ORDER — GLIMEPIRIDE 4 MG/1
4 TABLET ORAL EVERY MORNING
Qty: 90 TABLET | Refills: 1 | Status: SHIPPED | OUTPATIENT
Start: 2021-10-18 | End: 2022-04-18

## 2021-10-18 RX ORDER — SULFAMETHOXAZOLE AND TRIMETHOPRIM 800; 160 MG/1; MG/1
1 TABLET ORAL 2 TIMES DAILY
Qty: 10 TABLET | Refills: 0 | Status: SHIPPED | OUTPATIENT
Start: 2021-10-18 | End: 2021-10-23

## 2021-10-18 NOTE — PROGRESS NOTES
10/18/2021    Blood pressure 124/82, pulse 85, height 5' 6\" (1.676 m), weight 280 lb (127 kg), SpO2 97 %, not currently breastfeeding. Zana Lewis (:  1970) is a 54 y.o. female, here for evaluation of the following medical concerns:    Chief Complaint   Patient presents with    Diabetes     f/u dm check    Other     poss uti     Here for routine follow up of DM-2. No complications. Currently on trulicity 3, metformin 2 gm, amaryl 2.    a1c today 7.9. She was hoping for better numbers. She did well at first with wt loss and avoiding sugar drinks. But did let diet go in past couple months. Wt currently 3 lbs less than before. No formal wt loss programs. Drinking only water. She drinks some pop and fruit juice still, but is now less. She lost weight when she was only drinking water. No is 16 oz soda a day with some juice also. Lab Results   Component Value Date    LABA1C 8.0 2021    LABA1C 8.0 2021    LABA1C 6.8 2020     Does not test glucoses at home. She is due for eye exam.        Last renal function test:   Lab Results   Component Value Date     2021    K 4.4 2021    BUN 14 2021    CREATININE 0.6 2021     CrCl cannot be calculated (Patient's most recent lab result is older than the maximum 120 days allowed. ).     + freq/pink urine, dysuria off an on the past month. No f/c. No back pain. No n/v. She notes some swallowing difficulty and reflux that is intermittently an issue and leads to choking. Present this year or two, but may be getting more frequently. Like she gets distracted when swallowing and chokes on the liquids  No weakness  No prior throat problems, but says she has 'always' had trouble swallowing pills. Bakari small ones. 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 So has a crazy busy life.     Patient Active Problem List   Diagnosis    Allergic rhinitis    Vitamin D deficiency    Type 2 diabetes mellitus without complication ([facility] dx .  Obesity, morbid, BMI 40.0-49.9 ([facility]    Pure hypercholesterolemia        Body mass index is 45.19 kg/m². Wt Readings from Last 3 Encounters:   10/18/21 280 lb (127 kg)   21 283 lb (128.4 kg)   21 283 lb (128.4 kg)       BP Readings from Last 3 Encounters:   10/18/21 124/82   21 130/82   21 126/82       Allergies   Allergen Reactions    Tetracyclines & Related        Prior to Visit Medications    Medication Sig Taking? Authorizing Provider   rosuvastatin (CRESTOR) 20 MG tablet TAKE 1 TABLET BY MOUTH DAILY Yes Nickie Beckwith MD   Dulaglutide (TRULICITY) 3 JZ/5.8KE SOPN Inject 3 mg into the skin once a week Yes Nickie Beckwith MD   glimepiride (AMARYL) 2 MG tablet TAKE 1 TABLET BY MOUTH EVERY MORNING Yes Nickie Beckwith MD   Blood Glucose Monitoring Suppl (TRUE METRIX METER) SAJI 1 Device by Does not apply route daily Yes LUCIANO Condon CNP   glucose blood VI test strips (TRUE METRIX BLOOD GLUCOSE TEST) strip 1 each by In Vitro route daily As needed. Yes LUCIANO Condon CNP   FREESTYLE LANCETS MISC TEST DAILY Yes Nickie Beckwith MD   FREESTYLE LITE strip TEST EVERY DAY Yes Nickie Beckwith MD   glucose monitoring kit (FREESTYLE) monitoring kit 1 kit by Does not apply route daily as needed Yes Nickie Beckwith MD   metFORMIN (GLUCOPHAGE) 1000 MG tablet TAKE 1 TABLET BY MOUTH TWICE DAILY WITH MEALS  Nickie Beckwith MD        Social History     Tobacco Use    Smoking status: Former Smoker     Quit date: 2007     Years since quittin.8    Smokeless tobacco: Never Used   Substance Use Topics    Alcohol use: Yes     Alcohol/week: 0.0 standard drinks     Comment: rarely    Drug use: No       Review of Systems As above     Physical Exam  Vitals and nursing note reviewed. Constitutional:       Appearance: Normal appearance.  She is well-developed. HENT:      Head: Normocephalic and atraumatic. Eyes:      Comments: + xanthelasmas bilat medial lids. Neck:      Thyroid: No thyromegaly. Vascular: No carotid bruit. Cardiovascular:      Rate and Rhythm: Normal rate and regular rhythm. Pulses: Normal pulses. Heart sounds: Normal heart sounds. No murmur heard. Pulmonary:      Effort: Pulmonary effort is normal. No respiratory distress. Breath sounds: Normal breath sounds. No wheezing or rales. Musculoskeletal:         General: Normal range of motion. Cervical back: Normal range of motion and neck supple. Right lower leg: No edema. Left lower leg: No edema. Skin:     General: Skin is warm and dry. Neurological:      General: No focal deficit present. Mental Status: She is alert and oriented to person, place, and time. Mental status is at baseline. Psychiatric:         Mood and Affect: Mood normal.         Behavior: Behavior normal.         Thought Content: Thought content normal.         Judgment: Judgment normal.         ASSESSMENT/PLAN:    1. Type 2 diabetes mellitus without complication, without long-term current use of insulin ([facility])  - a1c not to goal.  Plan to incr amaryl to 4 mg and she will give up soda again.  - POCT glycosylated hemoglobin (Hb A1C)  - metFORMIN (GLUCOPHAGE) 1000 MG tablet; TAKE 1 TABLET BY MOUTH TWICE DAILY WITH MEALS  Dispense: 180 tablet; Refill: 1    2. Obesity, morbid, BMI 40.0-49.9 ([facility]  - continue efforts to reduce calories and continue GLP-1 agent    3. Oropharyngeal dysphagia  - in lieu of swallow study, will have her see ent first for direct visualization and an opinion on the next best test for eval  - Leonela Steve MD, Otolaryngology, [facility]    4. Acute cystitis with hematuria  - rx bactrim x 5 d  - POCT Urinalysis no Micro      Return in about 4 months (around 2/18/2022) for follow up diabetes.     An  electronicsignature was used to authenticate this note.     --Zoey Caraballo MD on 10/18/2021 at 3:05 PM

## 2021-10-19 LAB — URINE CULTURE, ROUTINE: NORMAL

## 2021-10-20 DIAGNOSIS — R31.9 HEMATURIA, UNSPECIFIED TYPE: Primary | ICD-10-CM

## 2021-10-21 ENCOUNTER — TELEPHONE (OUTPATIENT)
Dept: FAMILY MEDICINE CLINIC | Age: 51
End: 2021-10-21

## 2021-10-21 NOTE — TELEPHONE ENCOUNTER
Patient returning olive phone call regarding urine culture lab results    Please give patient a call back   Please advise

## 2021-10-25 DIAGNOSIS — R31.9 HEMATURIA, UNSPECIFIED TYPE: ICD-10-CM

## 2021-10-25 LAB
BACTERIA: ABNORMAL /HPF
BILIRUBIN URINE: NEGATIVE
BLOOD, URINE: ABNORMAL
CLARITY: ABNORMAL
COLOR: YELLOW
EPITHELIAL CELLS, UA: 6 /HPF (ref 0–5)
GLUCOSE URINE: NEGATIVE MG/DL
HYALINE CASTS: 3 /LPF (ref 0–8)
KETONES, URINE: NEGATIVE MG/DL
LEUKOCYTE ESTERASE, URINE: ABNORMAL
MICROSCOPIC EXAMINATION: YES
NITRITE, URINE: NEGATIVE
PH UA: 6 (ref 5–8)
PROTEIN UA: 30 MG/DL
RBC UA: 5 /HPF (ref 0–4)
SPECIFIC GRAVITY UA: 1.02 (ref 1–1.03)
URINE TYPE: ABNORMAL
UROBILINOGEN, URINE: 0.2 E.U./DL
WBC UA: 26 /HPF (ref 0–5)

## 2021-10-26 DIAGNOSIS — N30.01 ACUTE CYSTITIS WITH HEMATURIA: Primary | ICD-10-CM

## 2021-10-26 RX ORDER — FLUCONAZOLE 100 MG/1
100 TABLET ORAL DAILY
Qty: 7 TABLET | Refills: 0 | Status: SHIPPED | OUTPATIENT
Start: 2021-10-26 | End: 2021-11-02

## 2021-10-26 RX ORDER — CIPROFLOXACIN 250 MG/1
250 TABLET, FILM COATED ORAL 2 TIMES DAILY
Qty: 14 TABLET | Refills: 0 | Status: SHIPPED | OUTPATIENT
Start: 2021-10-26 | End: 2021-11-02

## 2021-10-28 DIAGNOSIS — N30.01 ACUTE CYSTITIS WITH HEMATURIA: Primary | ICD-10-CM

## 2021-10-28 DIAGNOSIS — N30.01 ACUTE CYSTITIS WITH HEMATURIA: ICD-10-CM

## 2021-10-28 LAB — URINE CULTURE, ROUTINE: NORMAL

## 2021-11-04 ENCOUNTER — TELEPHONE (OUTPATIENT)
Dept: FAMILY MEDICINE CLINIC | Age: 51
End: 2021-11-04

## 2021-11-04 DIAGNOSIS — N30.01 ACUTE CYSTITIS WITH HEMATURIA: Primary | ICD-10-CM

## 2021-11-04 NOTE — TELEPHONE ENCOUNTER
If Arsh Nicely still has bladder irritation and sees blood, then it is time to see a urologist.  Her 2nd urine cultures came back negative. Referred to Dr Feliciano Miles. Have her call for an appt there: [facility]  [address] [place] [address] Partha  Napoleon Carrascotammy [facility]  Phone: [phone]    Thanks.

## 2021-11-05 NOTE — TELEPHONE ENCOUNTER
Patient returning olive phone call from today   Patient will have phone available any time today to call back   Please advise

## 2021-11-23 ENCOUNTER — TELEPHONE (OUTPATIENT)
Dept: FAMILY MEDICINE CLINIC | Age: 51
End: 2021-11-23

## 2021-11-23 DIAGNOSIS — F43.0 ANXIETY IN ACUTE STRESS REACTION: Primary | ICD-10-CM

## 2021-11-23 DIAGNOSIS — F41.1 ANXIETY IN ACUTE STRESS REACTION: Primary | ICD-10-CM

## 2021-11-23 RX ORDER — LORAZEPAM 0.5 MG/1
0.5 TABLET ORAL EVERY 8 HOURS PRN
Qty: 21 TABLET | Refills: 0 | Status: SHIPPED | OUTPATIENT
Start: 2021-11-23 | End: 2021-12-10

## 2021-11-29 LAB
FUNGUS (MYCOLOGY) CULTURE: NORMAL
FUNGUS STAIN: NORMAL

## 2021-12-02 ENCOUNTER — TELEPHONE (OUTPATIENT)
Dept: FAMILY MEDICINE CLINIC | Age: 51
End: 2021-12-02

## 2021-12-02 NOTE — TELEPHONE ENCOUNTER
Patient calling would like to know if she should continue taking lorazepam, or need a different anxiety medication to help her sleep at night. Patient was prescribed this by Dr. Behzad Nicole to take a few times a day. Patient is only taking this at night before she goes to sleep this medication is helping a lot. Patient anxiety has calmed down during the day wants something to help her sleep at night.    Patient would like to know if Dr. Behzad Nicole can send in a refill for this, or if Dr. Behzad Nicole is wanting to switch to a different medication to only take at night   Please give patient a call back to advise on medication   Please advise

## 2021-12-16 ENCOUNTER — APPOINTMENT (OUTPATIENT)
Dept: MRI IMAGING | Age: 51
End: 2021-12-16
Payer: COMMERCIAL

## 2021-12-16 ENCOUNTER — HOSPITAL ENCOUNTER (OUTPATIENT)
Dept: MRI IMAGING | Age: 51
Discharge: HOME | End: 2021-12-16
Payer: COMMERCIAL

## 2021-12-16 DIAGNOSIS — C67.6 MALIGNANT NEOPLASM OF URETERIC ORIFICE (HCC): ICD-10-CM

## 2021-12-16 PROCEDURE — 6360000004 HC RX CONTRAST MEDICATION: Performed by: INTERNAL MEDICINE

## 2021-12-16 PROCEDURE — A9576 INJ PROHANCE MULTIPACK: HCPCS | Performed by: INTERNAL MEDICINE

## 2021-12-16 PROCEDURE — 72197 MRI PELVIS W/O & W/DYE: CPT

## 2021-12-16 PROCEDURE — 72158 MRI LUMBAR SPINE W/O & W/DYE: CPT

## 2021-12-16 RX ADMIN — GADOTERIDOL 20 ML: 279.3 INJECTION, SOLUTION INTRAVENOUS at 21:43

## 2021-12-23 LAB — PATHOLOGY/CYTOLOGY REPORT: NORMAL

## 2022-01-12 ENCOUNTER — TELEPHONE (OUTPATIENT)
Dept: FAMILY MEDICINE CLINIC | Age: 52
End: 2022-01-12

## 2022-01-12 NOTE — TELEPHONE ENCOUNTER
Pt is on chemo and she was told her BP is elevated pt is not on any bp medications currently she is coming in february but not sure she should wait that long   BP readings are 146/77, 153/94  Pt has been having a lot of headaches lately thought it was sinus this all started about the time she was diagnosed to cancer started chemo 2 weeks ago she is not sure if its the chemo or what         Please advise

## 2022-01-12 NOTE — TELEPHONE ENCOUNTER
Did you want to see her before her appt in a month about her BP being elevated? Or is the chemo the possible cause?

## 2022-01-13 ENCOUNTER — HOSPITAL ENCOUNTER (EMERGENCY)
Age: 52
Discharge: HOME | End: 2022-01-13
Payer: COMMERCIAL

## 2022-01-13 ENCOUNTER — TELEPHONE (OUTPATIENT)
Dept: FAMILY MEDICINE CLINIC | Age: 52
End: 2022-01-13

## 2022-01-13 ENCOUNTER — APPOINTMENT (OUTPATIENT)
Dept: CT IMAGING | Age: 52
End: 2022-01-13
Payer: COMMERCIAL

## 2022-01-13 ENCOUNTER — APPOINTMENT (OUTPATIENT)
Dept: GENERAL RADIOLOGY | Age: 52
End: 2022-01-13
Payer: COMMERCIAL

## 2022-01-13 VITALS
OXYGEN SATURATION: 96 % | BODY MASS INDEX: 44.26 KG/M2 | WEIGHT: 265.65 LBS | RESPIRATION RATE: 15 BRPM | SYSTOLIC BLOOD PRESSURE: 142 MMHG | DIASTOLIC BLOOD PRESSURE: 69 MMHG | TEMPERATURE: 98 F | HEIGHT: 65 IN | HEART RATE: 81 BPM

## 2022-01-13 DIAGNOSIS — D70.1 LEUKOPENIA DUE TO ANTINEOPLASTIC CHEMOTHERAPY (HCC): Primary | ICD-10-CM

## 2022-01-13 DIAGNOSIS — F41.1 ANXIETY STATE: ICD-10-CM

## 2022-01-13 DIAGNOSIS — I10 ASYMPTOMATIC HYPERTENSION: ICD-10-CM

## 2022-01-13 DIAGNOSIS — T45.1X5A LEUKOPENIA DUE TO ANTINEOPLASTIC CHEMOTHERAPY (HCC): Primary | ICD-10-CM

## 2022-01-13 LAB
A/G RATIO: 1.2 (ref 1.1–2.2)
ALBUMIN SERPL-MCNC: 3.5 G/DL (ref 3.4–5)
ALP BLD-CCNC: 91 U/L (ref 40–129)
ALT SERPL-CCNC: 23 U/L (ref 10–40)
ANION GAP SERPL CALCULATED.3IONS-SCNC: 11 MMOL/L (ref 3–16)
AST SERPL-CCNC: 12 U/L (ref 15–37)
ATYPICAL LYMPHOCYTE RELATIVE PERCENT: 5 % (ref 0–6)
BANDED NEUTROPHILS RELATIVE PERCENT: 2 % (ref 0–7)
BASE EXCESS VENOUS: 3.1 MMOL/L
BASOPHILS ABSOLUTE: 0 K/UL (ref 0–0.2)
BASOPHILS RELATIVE PERCENT: 1 %
BILIRUB SERPL-MCNC: <0.2 MG/DL (ref 0–1)
BILIRUBIN URINE: NEGATIVE
BLOOD, URINE: NEGATIVE
BUN BLDV-MCNC: 11 MG/DL (ref 7–20)
CALCIUM SERPL-MCNC: 9.2 MG/DL (ref 8.3–10.6)
CARBOXYHEMOGLOBIN: 0.4 %
CHLORIDE BLD-SCNC: 99 MMOL/L (ref 99–110)
CHP ED QC CHECK: YES
CLARITY: CLEAR
CO2: 24 MMOL/L (ref 21–32)
COLOR: YELLOW
CREAT SERPL-MCNC: <0.5 MG/DL (ref 0.6–1.1)
EKG ATRIAL RATE: 71 BPM
EKG DIAGNOSIS: NORMAL
EKG P AXIS: 30 DEGREES
EKG P-R INTERVAL: 156 MS
EKG Q-T INTERVAL: 368 MS
EKG QRS DURATION: 86 MS
EKG QTC CALCULATION (BAZETT): 399 MS
EKG R AXIS: 8 DEGREES
EKG T AXIS: 24 DEGREES
EKG VENTRICULAR RATE: 71 BPM
EOSINOPHILS ABSOLUTE: 0 K/UL (ref 0–0.6)
EOSINOPHILS RELATIVE PERCENT: 0 %
GFR AFRICAN AMERICAN: >60
GFR NON-AFRICAN AMERICAN: >60
GLUCOSE BLD-MCNC: 269 MG/DL (ref 70–99)
GLUCOSE BLD-MCNC: 290 MG/DL
GLUCOSE BLD-MCNC: 290 MG/DL (ref 70–99)
GLUCOSE URINE: 500 MG/DL
HCO3 VENOUS: 28 MMOL/L (ref 23–29)
HCT VFR BLD CALC: 34.7 % (ref 36–48)
HEMATOLOGY PATH CONSULT: YES
HEMOGLOBIN: 11.4 G/DL (ref 12–16)
KETONES, URINE: NEGATIVE MG/DL
LEUKOCYTE ESTERASE, URINE: NEGATIVE
LIPASE: 31 U/L (ref 13–60)
LYMPHOCYTES ABSOLUTE: 0.9 K/UL (ref 1–5.1)
LYMPHOCYTES RELATIVE PERCENT: 54 %
MCH RBC QN AUTO: 25.9 PG (ref 26–34)
MCHC RBC AUTO-ENTMCNC: 33 G/DL (ref 31–36)
MCV RBC AUTO: 78.5 FL (ref 80–100)
METHEMOGLOBIN VENOUS: 0.4 %
MICROSCOPIC EXAMINATION: ABNORMAL
MONOCYTES ABSOLUTE: 0 K/UL (ref 0–1.3)
MONOCYTES RELATIVE PERCENT: 2 %
NEUTROPHILS ABSOLUTE: 0.6 K/UL (ref 1.7–7.7)
NEUTROPHILS RELATIVE PERCENT: 36 %
NITRITE, URINE: NEGATIVE
O2 SAT, VEN: 89 %
O2 THERAPY: NORMAL
PCO2, VEN: 43.6 MMHG (ref 40–50)
PDW BLD-RTO: 16.1 % (ref 12.4–15.4)
PERFORMED ON: ABNORMAL
PH UA: 6 (ref 5–8)
PH VENOUS: 7.42 (ref 7.35–7.45)
PLATELET # BLD: 56 K/UL (ref 135–450)
PLATELET SLIDE REVIEW: ABNORMAL
PMV BLD AUTO: 7.9 FL (ref 5–10.5)
PO2, VEN: 57 MMHG
POTASSIUM REFLEX MAGNESIUM: 4 MMOL/L (ref 3.5–5.1)
PROTEIN UA: NEGATIVE MG/DL
RBC # BLD: 4.42 M/UL (ref 4–5.2)
SLIDE REVIEW: ABNORMAL
SODIUM BLD-SCNC: 134 MMOL/L (ref 136–145)
SPECIFIC GRAVITY UA: 1.01 (ref 1–1.03)
TCO2 CALC VENOUS: 29 MMOL/L
TOTAL PROTEIN: 6.5 G/DL (ref 6.4–8.2)
TROPONIN: <0.01 NG/ML
URINE REFLEX TO CULTURE: ABNORMAL
URINE TYPE: ABNORMAL
UROBILINOGEN, URINE: 0.2 E.U./DL
WBC # BLD: 1.5 K/UL (ref 4–11)

## 2022-01-13 PROCEDURE — 70450 CT HEAD/BRAIN W/O DYE: CPT

## 2022-01-13 PROCEDURE — 93005 ELECTROCARDIOGRAM TRACING: CPT | Performed by: NURSE PRACTITIONER

## 2022-01-13 PROCEDURE — 84484 ASSAY OF TROPONIN QUANT: CPT

## 2022-01-13 PROCEDURE — 71045 X-RAY EXAM CHEST 1 VIEW: CPT

## 2022-01-13 PROCEDURE — 2580000003 HC RX 258: Performed by: NURSE PRACTITIONER

## 2022-01-13 PROCEDURE — 96360 HYDRATION IV INFUSION INIT: CPT

## 2022-01-13 PROCEDURE — 81003 URINALYSIS AUTO W/O SCOPE: CPT

## 2022-01-13 PROCEDURE — 85025 COMPLETE CBC W/AUTO DIFF WBC: CPT

## 2022-01-13 PROCEDURE — 83690 ASSAY OF LIPASE: CPT

## 2022-01-13 PROCEDURE — 80053 COMPREHEN METABOLIC PANEL: CPT

## 2022-01-13 PROCEDURE — 99283 EMERGENCY DEPT VISIT LOW MDM: CPT

## 2022-01-13 PROCEDURE — 82803 BLOOD GASES ANY COMBINATION: CPT

## 2022-01-13 PROCEDURE — 93010 ELECTROCARDIOGRAM REPORT: CPT | Performed by: INTERNAL MEDICINE

## 2022-01-13 PROCEDURE — 6370000000 HC RX 637 (ALT 250 FOR IP): Performed by: NURSE PRACTITIONER

## 2022-01-13 RX ORDER — METOPROLOL SUCCINATE 25 MG/1
25 TABLET, EXTENDED RELEASE ORAL ONCE
Status: COMPLETED | OUTPATIENT
Start: 2022-01-13 | End: 2022-01-13

## 2022-01-13 RX ORDER — METOPROLOL SUCCINATE 25 MG/1
25 TABLET, EXTENDED RELEASE ORAL DAILY
Qty: 45 TABLET | Refills: 0 | Status: SHIPPED | OUTPATIENT
Start: 2022-01-13 | End: 2022-03-01 | Stop reason: SDUPTHER

## 2022-01-13 RX ORDER — 0.9 % SODIUM CHLORIDE 0.9 %
1000 INTRAVENOUS SOLUTION INTRAVENOUS ONCE
Status: COMPLETED | OUTPATIENT
Start: 2022-01-13 | End: 2022-01-13

## 2022-01-13 RX ADMIN — METOPROLOL SUCCINATE 25 MG: 25 TABLET, FILM COATED, EXTENDED RELEASE ORAL at 14:49

## 2022-01-13 RX ADMIN — SODIUM CHLORIDE 1000 ML: 9 INJECTION, SOLUTION INTRAVENOUS at 12:34

## 2022-01-13 ASSESSMENT — ENCOUNTER SYMPTOMS
SHORTNESS OF BREATH: 0
CHEST TIGHTNESS: 0
BACK PAIN: 0
DIARRHEA: 0
BLOOD IN STOOL: 0
SORE THROAT: 0
RESPIRATORY NEGATIVE: 1
WHEEZING: 0
CONSTIPATION: 0
COUGH: 0
ABDOMINAL PAIN: 0
NAUSEA: 0
VOMITING: 0
EYE PAIN: 0

## 2022-01-13 ASSESSMENT — PAIN SCALES - GENERAL: PAINLEVEL_OUTOF10: 0

## 2022-01-13 NOTE — TELEPHONE ENCOUNTER
See previous message, checked with oncology, this couldhave caused her BP to be up, but is is kind of late now for it to continue being high. Was told to call us back.

## 2022-01-13 NOTE — TELEPHONE ENCOUNTER
These readings would not be high enough to need immediate action nor cause headaches. It would be better for sully to buy a bp monitor ([employer] brand) and monitor at home, at rest, a couple times a week.   Then we can review the data at her upcoming visit

## 2022-01-13 NOTE — TELEPHONE ENCOUNTER
Pt calling back stating that she spoke with the Oncologist and was told they would have expected a change in BP two weeks ago and not this late after starting chemo. Stated she was told that the steroids could have caused a change in BP but it is kind of late for it to be the cause.     Advised by MA to send massage

## 2022-01-13 NOTE — ED PROVIDER NOTES
[facility]        Pt Name: Carol Hernandez  MRN: [id number]  [place] 1970  Date of evaluation: 1/13/2022  Provider: LUCIANO Gonzalez - HORTENCIA  PCP: Diogenes Lin MD  Note Started: 12:20 PM EST       TIFFANIE. I have evaluated this patient. My supervising physician was available for consultation. CHIEF COMPLAINT       Chief Complaint   Patient presents with    Hypertension     Has had several high readings since yesterday, has been taking medications as ordered       HISTORY OF PRESENT ILLNESS   (Location, Timing/Onset, Context/Setting, Quality, Duration, Modifying Factors, Severity, Associated Signs and Symptoms)  Note limiting factors. Chief Complaint: high blood pressure readings     Carol Hernandez is a 50 y.o. female who presents to the ED today with concerns for high blood pressure readings. She states that when she was at chemotherapy treatment last week she was noted to have systolics into the 125A/MRP 150s. She grove very concerned about this, contacted her primary care provider who told her to intermittently check her blood pressure at home. She has been checking it several times a day, being increasingly more anxious about higher and higher readings and therefore came to the ED for further evaluation and treatment. Blood pressure this morning was 191/103. She however did have some normal/borderline high blood pressures this morning already into the 150s. She is very anxious appearing. She states that this is worsening her anxiety. Does have some lorazepam for her newly diagnosed bladder cancer but does not take it very regularly. She states that she cannot wait to be seen by provider as her outpatient primary care appointment is 2/14. She therefore came to the ED for further evaluation    Nursing Notes were all reviewed and agreed with or any disagreements were addressed in the HPI.     REVIEW OF SYSTEMS (2-9 systems for level 4, 10 or more for level 5)     Review of Systems   Constitutional: Negative for chills, fatigue and fever. HENT: Negative for congestion and sore throat. Eyes: Negative for pain and visual disturbance. Respiratory: Negative. Negative for cough, chest tightness, shortness of breath and wheezing. Cardiovascular: Negative. Negative for chest pain, palpitations and leg swelling. Gastrointestinal: Negative for abdominal pain, blood in stool, constipation, diarrhea, nausea and vomiting. Genitourinary: Negative for dysuria and hematuria. Musculoskeletal: Negative for back pain, myalgias, neck pain and neck stiffness. Skin: Negative for rash and wound. Allergic/Immunologic: Positive for immunocompromised state. Neurological: Negative for dizziness, tremors, seizures, syncope, facial asymmetry, speech difficulty, weakness, light-headedness, numbness and headaches. All other systems reviewed and are negative. Positives and Pertinent negatives as per HPI. Except as noted above in the ROS, all other systems were reviewed and negative. PAST MEDICAL HISTORY     Past Medical History:   Diagnosis Date    Allergic rhinitis     Elevated blood pressure     Uncontrolled diabetes mellitus type 2 without complications 4/37/6908         SURGICAL HISTORY     Past Surgical History:   Procedure Laterality Date    MANDIBLE SURGERY  1992    for jaw alignment         CURRENTMEDICATIONS       Previous Medications    BLOOD GLUCOSE MONITORING SUPPL (TRUE METRIX METER) SAJI    1 Device by Does not apply route daily    FREESTYLE LANCETS MISC    TEST DAILY    FREESTYLE LITE STRIP    TEST EVERY DAY    GLIMEPIRIDE (AMARYL) 4 MG TABLET    Take 1 tablet by mouth every morning    GLUCOSE BLOOD VI TEST STRIPS (TRUE METRIX BLOOD GLUCOSE TEST) STRIP    1 each by In Vitro route daily As needed.     GLUCOSE MONITORING KIT (FREESTYLE) MONITORING KIT    1 kit by Does not apply route daily as needed    METFORMIN (GLUCOPHAGE) 1000 MG TABLET    TAKE 1 TABLET BY MOUTH TWICE DAILY WITH MEALS    ROSUVASTATIN (CRESTOR) 20 MG TABLET    TAKE 1 TABLET BY MOUTH DAILY    TRULICITY 3 MB/7.5RW SOPN    INJECT 3 MG UNDER THE SKIN ONCE A WEEK         ALLERGIES     Tetracyclines & related    FAMILYHISTORY       Family History   Problem Relation Age of Onset    Diabetes Mother    [facility] Arthritis Mother     High Blood Pressure Mother     High Cholesterol Mother     Kidney Disease Mother     Thyroid Disease Mother           SOCIAL HISTORY       Social History     Tobacco Use    Smoking status: Former Smoker     Quit date: 1/1/2007     Years since quitting: 15.0    Smokeless tobacco: Never Used   Substance Use Topics    Alcohol use: Yes     Alcohol/week: 0.0 standard drinks     Comment: rarely    Drug use: No       SCREENINGS             PHYSICAL EXAM    (up to 7 for level 4, 8 or more for level 5)     ED Triage Vitals [01/13/22 1144]   BP Temp Temp Source Pulse Resp SpO2 Height Weight   (!) 213/105 98 °F (36.7 °C) Temporal 97 16 97 % 5' 5\" (1.651 m) 265 lb 10.5 oz (120.5 kg)       Physical Exam  Vitals and nursing note reviewed. Constitutional:       General: She is not in acute distress. Appearance: Normal appearance. She is not ill-appearing, toxic-appearing or diaphoretic. HENT:      Head: Normocephalic and atraumatic. No raccoon eyes, Nathan's sign, right periorbital erythema or left periorbital erythema. Right Ear: Hearing and external ear normal.      Left Ear: Hearing and external ear normal.      Nose: Nose normal. No laceration, nasal tenderness, mucosal edema, congestion or rhinorrhea. Right Nostril: No epistaxis. Left Nostril: No epistaxis. Mouth/Throat:      Lips: Pink. No lesions. Mouth: Mucous membranes are moist.      Tongue: No lesions. Tongue does not deviate from midline. Pharynx: Oropharynx is clear. Uvula midline.  No pharyngeal swelling, oropharyngeal exudate, posterior oropharyngeal erythema or uvula swelling. Tonsils: No tonsillar exudate or tonsillar abscesses. Eyes:      General: Lids are normal.         Right eye: No discharge. Left eye: No discharge. Extraocular Movements: Extraocular movements intact. Neck:      Trachea: Phonation normal. No abnormal tracheal secretions or tracheal deviation. Comments: No meningismus   Cardiovascular:      Rate and Rhythm: Normal rate and regular rhythm. Pulses: Normal pulses. Heart sounds: Normal heart sounds. No murmur heard. No friction rub. No gallop. Pulmonary:      Effort: Pulmonary effort is normal. No respiratory distress. Breath sounds: Normal breath sounds. No stridor. No wheezing, rhonchi or rales. Abdominal:      General: Bowel sounds are normal. There is no distension. Palpations: Abdomen is soft. Tenderness: There is no abdominal tenderness. There is no guarding or rebound. Musculoskeletal:         General: Normal range of motion. Cervical back: Full passive range of motion without pain, normal range of motion and neck supple. No rigidity. No spinous process tenderness or muscular tenderness. Lymphadenopathy:      Cervical: No cervical adenopathy. Skin:     General: Skin is warm and dry. Capillary Refill: Capillary refill takes less than 2 seconds. Neurological:      General: No focal deficit present. Mental Status: She is alert and oriented to person, place, and time. GCS: GCS eye subscore is 4. GCS verbal subscore is 5. GCS motor subscore is 6. Sensory: Sensation is intact. Motor: Motor function is intact. Gait: Gait is intact. Psychiatric:         Mood and Affect: Mood is anxious. Speech: Speech is rapid and pressured. Behavior: Behavior is hyperactive.          Cognition and Memory: Cognition normal.         DIAGNOSTIC RESULTS   LABS:    Labs Reviewed   CBC WITH AUTO DIFFERENTIAL - Abnormal; [id number]       When ordered only abnormal lab results are displayed. All other labs were within normal range or not returned as of this dictation. EKG: When ordered, EKG's are interpreted by the Emergency Department Physician in the absence of a cardiologist.  Please see their note for interpretation of EKG. RADIOLOGY:   Non-plain film images such as CT, Ultrasound and MRI are read by the radiologist. Plain radiographic images are visualized and preliminarily interpreted by the ED Provider with the below findings:        Interpretation per the Radiologist below, if available at the time of this note:    XR CHEST PORTABLE   Final Result   No acute process. CT Head WO Contrast   Final Result   No acute intracranial abnormality. No results found. PROCEDURES   Unless otherwise noted below, none     Procedures    CRITICAL CARE TIME   CRITICAL CARE NOTE:  There was a high probability of clinically significant life-threatening deterioration of the patient's condition requiring my urgent intervention. Total critical care time was at least 15 minutes. This includes vital sign monitoring, pulse oximetry monitoring, telemetry monitoring, clinical response to the IV medications, reviewing the nursing notes, consultation time, dictation/documentation time, and interpretation of the labwork. This excludes any separately billable procedures performed.         CONSULTS:  IP CONSULT TO PRIMARY CARE PROVIDER      EMERGENCY DEPARTMENT COURSE and DIFFERENTIAL DIAGNOSIS/MDM:   Vitals:    Vitals:    01/13/22 1144 01/13/22 1233   BP: (!) 213/105 (!) 166/80   Pulse: 97 85   Resp: 16 17   Temp: 98 °F (36.7 °C)    TempSrc: Temporal    SpO2: 97% 97%   Weight: 265 lb 10.5 oz (120.5 kg)    Height: 5' 5\" (1.651 m)        Patient was given the following medications:  Medications   metoprolol succinate (TOPROL XL) extended release tablet 25 mg (has no administration in time range)   0.9 % sodium chloride bolus (0 mLs IntraVENous Stopped 1/13/22 1325)           MDM: See HPI and above her full presentation physical exam.  Differential diagnoses include hypertension, hypertensive urgency, hypertensive emergency, endorgan dysfunction, anxiety, electrolyte derangement, dehydration, hyperglycemia, DKA, other    Patient does have a leukopenia of 1.5. Consistent with receiving chemotherapy treatment secondary to bladder cancer. Stable hemoglobin at 11.4 with bradycardia of 34.7    Patient does have a slightly elevated glucose at 298 but no evidence for DKA. And has no ketones in her urine, VBG is unremarkable. Metabolic panel shows no severe electrolyte derangements or MARIANNA. LFTs and lipase within normal limits. EKG officially interpreted per my attending, sinus rhythm. Troponin negative. Plain films and CTs as read by the radiologist as above. I consulted and spoke to Dr. Keyona Alvarez. Given that there is probably a large component of anxiety, will treat with metoprolol, for a primary depression of the sympathetic response. We will start her on the low-dose, 25 mg daily of the metoprolol XL and have her follow-up as scheduled with Dr. Keyona Alvarez next month. She is to return immediately for any new or worsening symptoms and to follow-up with her PCP. She verbalized understanding, is in agreement with this plan as well as the plan of discharge has no further questions or concerns    FINAL IMPRESSION      1. Leukopenia due to antineoplastic chemotherapy ([address]    2. Asymptomatic hypertension    3.  Anxiety state          DISPOSITION/PLAN   DISPOSITION        PATIENT REFERRED TO:  Connor Obregon MD  [address] [facility]  [address]  [phone]    Go in 1 month      [facility] Emergency Department  [address] [facility]  [phone]  Go to   As needed, If symptoms worsen      DISCHARGE MEDICATIONS:  New Prescriptions    METOPROLOL SUCCINATE (TOPROL XL) 25 MG EXTENDED RELEASE TABLET    Take 1 tablet by mouth daily       DISCONTINUED MEDICATIONS:  Discontinued Medications    No medications on file              (Please note that portions of this note were completed with a voice recognition program.  Efforts were made to edit the dictations but occasionally words are mis-transcribed.)    LUCIANO Burnett CNP (electronically signed)            LUCIANO Burnett CNP  01/13/22 3587

## 2022-01-13 NOTE — TELEPHONE ENCOUNTER
Pt calling in regards to message. Stated that today her bp this morning at 7:10am 191/101 and at 8:47am was 150/86 and at 9:54am 200/100 pulse of 93. Stated that she is also have back pain and would like to discuss what she can take without messing with her chemo. Stated that she has been having headaches, runny nose with mucus, and stuffy for the last 2 weeks they are getting worse after she started chemo. Stated that she took two tylenol to help with her back pain this morning. Stated that she also had occular migraines. Stated that usually dehydration causes those but she is drinking so much water on chemo. Triaged pt  Advised to have pt call Oncologist that is doing Chemo treatment. Stated that she would call them and call us back with what she finds out.

## 2022-01-13 NOTE — TELEPHONE ENCOUNTER
Patient called back   Wanting to let Dr Chau Valera know she is at [address] ed now did not want to wait blood pressure too elevated.    Advised patient to call us when out of ed will schedule an ed follow up     [facility]

## 2022-01-14 LAB — HEMATOLOGY PATH CONSULT: NORMAL

## 2022-02-14 ENCOUNTER — OFFICE VISIT (OUTPATIENT)
Dept: FAMILY MEDICINE CLINIC | Age: 52
End: 2022-02-14
Payer: COMMERCIAL

## 2022-02-14 VITALS
WEIGHT: 258 LBS | BODY MASS INDEX: 42.99 KG/M2 | HEIGHT: 65 IN | OXYGEN SATURATION: 96 % | HEART RATE: 100 BPM | SYSTOLIC BLOOD PRESSURE: 140 MMHG | DIASTOLIC BLOOD PRESSURE: 80 MMHG

## 2022-02-14 DIAGNOSIS — F41.1 ANXIETY IN ACUTE STRESS REACTION: ICD-10-CM

## 2022-02-14 DIAGNOSIS — F43.0 ANXIETY IN ACUTE STRESS REACTION: ICD-10-CM

## 2022-02-14 DIAGNOSIS — E11.9 TYPE 2 DIABETES MELLITUS WITHOUT COMPLICATION, WITHOUT LONG-TERM CURRENT USE OF INSULIN (HCC): Primary | ICD-10-CM

## 2022-02-14 LAB — HBA1C MFR BLD: 8.4 %

## 2022-02-14 PROCEDURE — 3052F HG A1C>EQUAL 8.0%<EQUAL 9.0%: CPT | Performed by: FAMILY MEDICINE

## 2022-02-14 PROCEDURE — 99214 OFFICE O/P EST MOD 30 MIN: CPT | Performed by: FAMILY MEDICINE

## 2022-02-14 PROCEDURE — 83036 HEMOGLOBIN GLYCOSYLATED A1C: CPT | Performed by: FAMILY MEDICINE

## 2022-02-14 RX ORDER — PHENAZOPYRIDINE HYDROCHLORIDE 100 MG/1
100 TABLET, FILM COATED ORAL 3 TIMES DAILY
Qty: 9 TABLET | Refills: 0 | Status: SHIPPED | OUTPATIENT
Start: 2022-02-14 | End: 2022-02-17

## 2022-02-14 RX ORDER — PIOGLITAZONEHYDROCHLORIDE 15 MG/1
15 TABLET ORAL DAILY
Qty: 30 TABLET | Refills: 3 | Status: SHIPPED | OUTPATIENT
Start: 2022-02-14 | End: 2022-02-14

## 2022-02-14 RX ORDER — LORAZEPAM 0.5 MG/1
0.5 TABLET ORAL EVERY 8 HOURS PRN
Qty: 30 TABLET | Refills: 1 | Status: SHIPPED | OUTPATIENT
Start: 2022-02-14 | End: 2022-04-15

## 2022-02-14 RX ORDER — PIOGLITAZONEHYDROCHLORIDE 15 MG/1
15 TABLET ORAL DAILY
Qty: 90 TABLET | Refills: 1 | Status: SHIPPED | OUTPATIENT
Start: 2022-02-14 | End: 2022-08-29

## 2022-02-14 NOTE — PROGRESS NOTES
2022    Blood pressure (!) 144/80, pulse 100, height 5' 5\" (1.651 m), weight 258 lb (117 kg), SpO2 96 %, not currently breastfeeding. Demetria Cantu (:  1970) is a 50 y.o. female, here for evaluation of the following medical concerns:    Chief Complaint   Patient presents with    Diabetes     f/u dm check     Here with mother, whom she cares for normally. But she is is the midst for bladder cancer treatment and brother is helping her. Father passed a month ago. She has been dealing with panic flares during her cancer and considering her prognosis. Ativan helps. But has some depressed mood also. No SI. sleep is hit and miss. She is currently undergoing chemo currently , 3 of 4 cycles. Oncology has evaluated and treated her for UTI and thrush as treatment complications (dehydration and steroids). This morning she has had worsening hematuria and irritative symptoms. She has had urine culture this [facility] at [facility]; she is to use pyridium. The steroids of treatment have been increasing sugars  Using trulicity and metformin and amaryl as below. Lab Results   Component Value Date    LABA1C 8.4 2022    LABA1C 7.9 10/18/2021    LABA1C 8.0 2021      Her sugars go up and down based on steroids. Home glucose levels: AM sugars are 140-150's often up to 2-300 after meals. But she admits that she has not tested her pre-meal sugars recently. She has one more cycle to go. Patient Active Problem List   Diagnosis    Allergic rhinitis    Vitamin D deficiency    [facility] mellitus [facility] dx .  Obesity, morbid, BMI 40.0-49.9 ([facility])    Pure hypercholesterolemia    Oropharyngeal dysphagia        Body mass index is 42.93 kg/m².     Wt Readings from Last 3 Encounters:   22 258 lb (117 kg)   22 265 lb 10.5 oz (120.5 kg)   10/18/21 280 lb (127 kg)       BP Readings from Last 3 Encounters:   22 (!) 144/80   22 (!) 142/69 10/18/21 124/82       Allergies   Allergen Reactions    Tetracyclines & Related        Prior to Visit Medications    Medication Sig Taking? Authorizing Provider   metoprolol succinate (TOPROL XL) 25 MG extended release tablet Take 1 tablet by mouth daily Yes LUCIANO Will CNP   TRULICITY 3 QD/9.3ZD SOPN INJECT 3 MG UNDER THE SKIN ONCE A WEEK Yes Elieser Quick MD   glimepiride (AMARYL) 4 MG tablet Take 1 tablet by mouth every morning Yes Elieser Quick MD   metFORMIN (GLUCOPHAGE) 1000 MG tablet TAKE 1 TABLET BY MOUTH TWICE DAILY WITH MEALS Yes Elieser Quick MD   rosuvastatin (CRESTOR) 20 MG tablet TAKE 1 TABLET BY MOUTH DAILY Yes Elieser Quick MD   Blood Glucose Monitoring Suppl (TRUE METRIX METER) SAJI 1 Device by Does not apply route daily Yes LUCIANO Vanessa CNP   glucose blood VI test strips (TRUE METRIX BLOOD GLUCOSE TEST) strip 1 each by In Vitro route daily As needed. Yes LUCIANO Vanessa CNP   FREESTYLE LANCETS MISC TEST DAILY Yes Elieser Quick MD   FREESTYLE LITE strip TEST EVERY DAY Yes Elieser Quick MD   glucose monitoring kit (FREESTYLE) monitoring kit 1 kit by Does not apply route daily as needed Yes Elieser Quick MD        Social History     Tobacco Use    Smoking status: Former Smoker     Quit date: 1/1/2007     Years since quitting: 15.1    Smokeless tobacco: Never Used   Substance Use Topics    Alcohol use: Yes     Alcohol/week: 0.0 standard drinks     Comment: rarely    Drug use: No       Review of Systems As above     Physical Exam  Vitals and nursing note reviewed. Constitutional:       Appearance: Normal appearance. She is well-developed. Neck:      Thyroid: No thyromegaly. Cardiovascular:      Rate and Rhythm: Normal rate and regular rhythm. Pulses: Normal pulses. Heart sounds: Normal heart sounds. No murmur heard.       Pulmonary:      Effort: Pulmonary effort is normal. No respiratory distress. Breath sounds: Normal breath sounds. No wheezing or rales. Musculoskeletal:         General: Normal range of motion. Cervical back: Normal range of motion. Right lower leg: No edema. Left lower leg: No edema. Skin:     General: Skin is warm and dry. Neurological:      General: No focal deficit present. Mental Status: She is alert and oriented to person, place, and time. Mental status is at baseline. Psychiatric:         Mood and Affect: Mood normal.         Behavior: Behavior normal.         Thought Content: Thought content normal.         Judgment: Judgment normal.         ASSESSMENT/PLAN:    1. Type 2 diabetes mellitus without complication, without long-term current use of insulin ([facility])  - POCT glycosylated hemoglobin (Hb A1C) incr to 8.4% (likely due to steroids)  - add actos 15 mg to regimen     2. Anxiety in acute stress reaction  - mood affected by stress of cancer treatment, which will be ongoing.   - start zoloft 50 mg. Continue prn ativan for now. - LORazepam (ATIVAN) 0.5 MG tablet; Take 1 tablet by mouth every 8 hours as needed for Anxiety for up to 60 days. Dispense: 30 tablet; Refill: 1      Return in about 3 months (around 5/14/2022) for follow up diabetes, follow up anxiety. An  [employer] was used to authenticate this note.     --Marty Adam MD on 2/14/2022 at 11:56 AM

## 2022-03-01 RX ORDER — METOPROLOL SUCCINATE 25 MG/1
25 TABLET, EXTENDED RELEASE ORAL DAILY
Qty: 90 TABLET | Refills: 1 | Status: SHIPPED | OUTPATIENT
Start: 2022-03-01 | End: 2022-05-16

## 2022-03-14 RX ORDER — GLIMEPIRIDE 2 MG/1
2 TABLET ORAL EVERY MORNING
Qty: 90 TABLET | Refills: 1 | Status: SHIPPED | OUTPATIENT
Start: 2022-03-14 | End: 2022-04-18 | Stop reason: DRUGHIGH

## 2022-04-11 DIAGNOSIS — E11.9 TYPE 2 DIABETES MELLITUS WITHOUT COMPLICATION, WITHOUT LONG-TERM CURRENT USE OF INSULIN (HCC): ICD-10-CM

## 2022-04-11 RX ORDER — ROSUVASTATIN CALCIUM 20 MG/1
20 TABLET, COATED ORAL DAILY
Qty: 90 TABLET | Refills: 1 | Status: SHIPPED | OUTPATIENT
Start: 2022-04-11 | End: 2022-10-03

## 2022-04-18 RX ORDER — GLIMEPIRIDE 4 MG/1
4 TABLET ORAL EVERY MORNING
Qty: 90 TABLET | Refills: 1 | Status: SHIPPED | OUTPATIENT
Start: 2022-04-18

## 2022-05-16 ENCOUNTER — OFFICE VISIT (OUTPATIENT)
Dept: FAMILY MEDICINE CLINIC | Age: 52
End: 2022-05-16
Payer: COMMERCIAL

## 2022-05-16 VITALS
DIASTOLIC BLOOD PRESSURE: 84 MMHG | BODY MASS INDEX: 45.65 KG/M2 | OXYGEN SATURATION: 96 % | HEART RATE: 85 BPM | HEIGHT: 65 IN | SYSTOLIC BLOOD PRESSURE: 120 MMHG | WEIGHT: 274 LBS

## 2022-05-16 DIAGNOSIS — F41.1 ANXIETY IN ACUTE STRESS REACTION: ICD-10-CM

## 2022-05-16 DIAGNOSIS — E78.00 PURE HYPERCHOLESTEROLEMIA: ICD-10-CM

## 2022-05-16 DIAGNOSIS — F43.0 ANXIETY IN ACUTE STRESS REACTION: ICD-10-CM

## 2022-05-16 DIAGNOSIS — E11.9 TYPE 2 DIABETES MELLITUS WITHOUT COMPLICATION, WITHOUT LONG-TERM CURRENT USE OF INSULIN (HCC): Primary | ICD-10-CM

## 2022-05-16 DIAGNOSIS — I10 PRIMARY HYPERTENSION: ICD-10-CM

## 2022-05-16 LAB — HBA1C MFR BLD: 6.9 %

## 2022-05-16 PROCEDURE — 3044F HG A1C LEVEL LT 7.0%: CPT | Performed by: FAMILY MEDICINE

## 2022-05-16 PROCEDURE — 99214 OFFICE O/P EST MOD 30 MIN: CPT | Performed by: FAMILY MEDICINE

## 2022-05-16 PROCEDURE — 83036 HEMOGLOBIN GLYCOSYLATED A1C: CPT | Performed by: FAMILY MEDICINE

## 2022-05-16 RX ORDER — LORAZEPAM 0.5 MG/1
TABLET ORAL
COMMUNITY

## 2022-05-16 RX ORDER — METOPROLOL SUCCINATE 50 MG/1
50 TABLET, EXTENDED RELEASE ORAL DAILY
Qty: 90 TABLET | Refills: 1 | Status: SHIPPED | OUTPATIENT
Start: 2022-05-16 | End: 2022-10-17

## 2022-05-16 ASSESSMENT — PATIENT HEALTH QUESTIONNAIRE - PHQ9
2. FEELING DOWN, DEPRESSED OR HOPELESS: 0
1. LITTLE INTEREST OR PLEASURE IN DOING THINGS: 0
SUM OF ALL RESPONSES TO PHQ QUESTIONS 1-9: 0
SUM OF ALL RESPONSES TO PHQ9 QUESTIONS 1 & 2: 0
SUM OF ALL RESPONSES TO PHQ QUESTIONS 1-9: 0

## 2022-05-16 NOTE — PROGRESS NOTES
(TRUE METRIX METER) SAJI 1 Device by Does not apply route daily Yes LUCIANO eFrrari CNP   glucose blood VI test strips (TRUE METRIX BLOOD GLUCOSE TEST) strip 1 each by In Vitro route daily As needed. Yes LUCIANO Ferrari CNP   FREESTYLE LANCETS MISC TEST DAILY Yes Denise Smith MD   FREESTYLE LITE strip TEST EVERY DAY Yes Denise Smith MD   glucose monitoring kit (FREESTYLE) monitoring kit 1 kit by Does not apply route daily as needed Yes Denise Simth MD        Social History     Tobacco Use    Smoking status: Former Smoker     Quit date: 1/1/2007     Years since quitting: 15.3    Smokeless tobacco: Never Used   Substance Use Topics    Alcohol use: Yes     Alcohol/week: 0.0 standard drinks     Comment: rarely    Drug use: No       Review of Systems    Physical Exam  Vitals and nursing note reviewed. Constitutional:       Appearance: Normal appearance. She is well-developed. She is obese. Neck:      Thyroid: No thyromegaly. Cardiovascular:      Rate and Rhythm: Normal rate and regular rhythm. Pulses: Normal pulses. Heart sounds: Normal heart sounds. No murmur heard. Pulmonary:      Effort: Pulmonary effort is normal. No respiratory distress. Breath sounds: Normal breath sounds. No wheezing or rales. Musculoskeletal:         General: Normal range of motion. Cervical back: Normal range of motion. Comments: Trace pretibial edema   Skin:     General: Skin is warm and dry. Neurological:      General: No focal deficit present. Mental Status: She is alert and oriented to person, place, and time. Mental status is at baseline. Psychiatric:         Mood and Affect: Mood normal.         Behavior: Behavior normal.         Thought Content: Thought content normal.         Judgment: Judgment normal.         ASSESSMENT/PLAN:    1.  Type 2 diabetes mellitus without complication, without long-term current use of insulin ([facility]  - reported glucose levels high; but a1c is to goal.  Will double check average glucose through fructosamine. Do not recommend med change for hyperglycemia that is expected to be temporary.  - POCT glycosylated hemoglobin (Hb A1C)  - Microalbumin / Creatinine Urine Ratio; Future  - FRUCTOSAMINE; Future    2. Primary hypertension  - incr toprol to 50 mg. After chemo/radiation can reduce dose back to 25 if she becomes symptomatically hypotensive.  - Comprehensive Metabolic Panel; Future    3. Pure hypercholesterolemia  - check FLP today. - Lipid Panel; Future    4. Situational anxiety  - continue zoloft 50 + prn ativan for now. Return in about 3 months (around 8/16/2022) for follow up diabetes, follow up HTN. An  [employer] was used to authenticate this note.     --Eloy Jones MD on 5/16/2022 at 9:45 AM

## 2022-05-24 DIAGNOSIS — Z11.59 ENCOUNTER FOR HEPATITIS C VIRUS SCREENING TEST FOR HIGH RISK PATIENT: ICD-10-CM

## 2022-05-24 DIAGNOSIS — E78.00 PURE HYPERCHOLESTEROLEMIA: ICD-10-CM

## 2022-05-24 DIAGNOSIS — E11.9 TYPE 2 DIABETES MELLITUS WITHOUT COMPLICATION, WITHOUT LONG-TERM CURRENT USE OF INSULIN (HCC): ICD-10-CM

## 2022-05-24 DIAGNOSIS — Z91.89 ENCOUNTER FOR HEPATITIS C VIRUS SCREENING TEST FOR HIGH RISK PATIENT: ICD-10-CM

## 2022-05-24 DIAGNOSIS — I10 PRIMARY HYPERTENSION: ICD-10-CM

## 2022-05-24 LAB
A/G RATIO: 1.5 (ref 1.1–2.2)
ALBUMIN SERPL-MCNC: 3.4 G/DL (ref 3.4–5)
ALP BLD-CCNC: 82 U/L (ref 40–129)
ALT SERPL-CCNC: 11 U/L (ref 10–40)
ANION GAP SERPL CALCULATED.3IONS-SCNC: 14 MMOL/L (ref 3–16)
AST SERPL-CCNC: 10 U/L (ref 15–37)
BILIRUB SERPL-MCNC: <0.2 MG/DL (ref 0–1)
BUN BLDV-MCNC: 16 MG/DL (ref 7–20)
CALCIUM SERPL-MCNC: 8.6 MG/DL (ref 8.3–10.6)
CHLORIDE BLD-SCNC: 101 MMOL/L (ref 99–110)
CHOLESTEROL, TOTAL: 168 MG/DL (ref 0–199)
CO2: 25 MMOL/L (ref 21–32)
CREAT SERPL-MCNC: 0.6 MG/DL (ref 0.6–1.1)
GFR AFRICAN AMERICAN: >60
GFR NON-AFRICAN AMERICAN: >60
GLUCOSE BLD-MCNC: 149 MG/DL (ref 70–99)
HDLC SERPL-MCNC: 54 MG/DL (ref 40–60)
HEPATITIS C ANTIBODY INTERPRETATION: NORMAL
LDL CHOLESTEROL CALCULATED: 94 MG/DL
POTASSIUM SERPL-SCNC: 4.4 MMOL/L (ref 3.5–5.1)
SODIUM BLD-SCNC: 140 MMOL/L (ref 136–145)
TOTAL PROTEIN: 5.6 G/DL (ref 6.4–8.2)
TRIGL SERPL-MCNC: 102 MG/DL (ref 0–150)
VLDLC SERPL CALC-MCNC: 20 MG/DL

## 2022-05-26 LAB — FRUCTOSAMINE: 210 UMOL/L (ref 205–285)

## 2022-06-09 ENCOUNTER — TELEPHONE (OUTPATIENT)
Dept: OTHER | Facility: CLINIC | Age: 52
End: 2022-06-09

## 2022-06-09 NOTE — TELEPHONE ENCOUNTER
Pt was contacted today as part of [facility] to schedule a Mammogram.       I left a message reminding the patient that they have an open order from Alethea Souza MD and to please contact me directly at [phone] to schedule a Mammogram.     Thanks,  Carmelo Peres LPN

## 2022-06-27 LAB
CREATININE URINE: 253.7 MG/DL (ref 28–259)
MICROALBUMIN UR-MCNC: 4.1 MG/DL
MICROALBUMIN/CREAT UR-RTO: 16.2 MG/G (ref 0–30)

## 2022-07-13 ENCOUNTER — HOSPITAL ENCOUNTER (OUTPATIENT)
Dept: CT IMAGING | Age: 52
Discharge: HOME | End: 2022-07-13
Payer: COMMERCIAL

## 2022-07-13 DIAGNOSIS — C67.6 MALIGNANT NEOPLASM OF URETERIC ORIFICE (HCC): ICD-10-CM

## 2022-07-13 PROCEDURE — 74177 CT ABD & PELVIS W/CONTRAST: CPT

## 2022-07-13 PROCEDURE — 6360000004 HC RX CONTRAST MEDICATION: Performed by: INTERNAL MEDICINE

## 2022-07-13 RX ADMIN — IOHEXOL 50 ML: 240 INJECTION, SOLUTION INTRATHECAL; INTRAVASCULAR; INTRAVENOUS; ORAL at 09:45

## 2022-07-13 RX ADMIN — IOPAMIDOL 75 ML: 755 INJECTION, SOLUTION INTRAVENOUS at 09:45

## 2022-08-11 ENCOUNTER — TELEPHONE (OUTPATIENT)
Dept: FAMILY MEDICINE CLINIC | Age: 52
End: 2022-08-11

## 2022-08-11 RX ORDER — DULAGLUTIDE 3 MG/.5ML
INJECTION, SOLUTION SUBCUTANEOUS
Qty: 2 ML | Refills: 5 | Status: SHIPPED | OUTPATIENT
Start: 2022-08-11

## 2022-08-11 NOTE — TELEPHONE ENCOUNTER
Please tell sully I will call in Paxlovid for her (di)    She can also use OTC meds that she feels help her symptoms. Remind her of isolation guidelines- best to go to [facility]. parisa wu to use their isolation calculator: [email or web address].[email or web address] html    Call if has severe symptoms like shortness of breath. Thanks.

## 2022-08-11 NOTE — TELEPHONE ENCOUNTER
Positive covid test today -home test    Sore throat- trouble swallowing water  Little bit of a cough  Pain all the way to ears  No fever  Lower sinus cavities   No sob, wheezing     Using vicks and taking advil and is taking allegra everyday so was scared to take any other sinus meds      Exposed on Monday  Symptoms started Tuesday night    Wants to know if there is anything she can take for this     Pharm is confirmed- di [facility] [address]

## 2022-08-11 NOTE — TELEPHONE ENCOUNTER
Called pt and relayed message   Pt agrees with plan and will call us if things get worse   Thank you

## 2022-08-11 NOTE — TELEPHONE ENCOUNTER
Due for follow up with Dr Po De La Vega- please call them to schedule at their earliest convenience. (meds have been refilled this time)

## 2022-08-29 ENCOUNTER — OFFICE VISIT (OUTPATIENT)
Dept: FAMILY MEDICINE CLINIC | Age: 52
End: 2022-08-29
Payer: COMMERCIAL

## 2022-08-29 VITALS
RESPIRATION RATE: 18 BRPM | SYSTOLIC BLOOD PRESSURE: 130 MMHG | HEART RATE: 88 BPM | BODY MASS INDEX: 46.65 KG/M2 | HEIGHT: 65 IN | OXYGEN SATURATION: 97 % | WEIGHT: 280 LBS | DIASTOLIC BLOOD PRESSURE: 70 MMHG

## 2022-08-29 DIAGNOSIS — I10 PRIMARY HYPERTENSION: ICD-10-CM

## 2022-08-29 DIAGNOSIS — E11.9 TYPE 2 DIABETES MELLITUS WITHOUT COMPLICATION, WITHOUT LONG-TERM CURRENT USE OF INSULIN (HCC): Primary | ICD-10-CM

## 2022-08-29 DIAGNOSIS — E66.01 OBESITY, MORBID, BMI 40.0-49.9 (HCC): ICD-10-CM

## 2022-08-29 DIAGNOSIS — F43.0 ANXIETY IN ACUTE STRESS REACTION: ICD-10-CM

## 2022-08-29 DIAGNOSIS — E78.00 PURE HYPERCHOLESTEROLEMIA: ICD-10-CM

## 2022-08-29 DIAGNOSIS — Z12.11 SCREEN FOR COLON CANCER: ICD-10-CM

## 2022-08-29 DIAGNOSIS — F41.1 ANXIETY IN ACUTE STRESS REACTION: ICD-10-CM

## 2022-08-29 LAB — HBA1C MFR BLD: 7.9 %

## 2022-08-29 PROCEDURE — 90750 HZV VACC RECOMBINANT IM: CPT | Performed by: FAMILY MEDICINE

## 2022-08-29 PROCEDURE — 90472 IMMUNIZATION ADMIN EACH ADD: CPT | Performed by: FAMILY MEDICINE

## 2022-08-29 PROCEDURE — 90715 TDAP VACCINE 7 YRS/> IM: CPT | Performed by: FAMILY MEDICINE

## 2022-08-29 PROCEDURE — 3051F HG A1C>EQUAL 7.0%<8.0%: CPT | Performed by: FAMILY MEDICINE

## 2022-08-29 PROCEDURE — 83036 HEMOGLOBIN GLYCOSYLATED A1C: CPT | Performed by: FAMILY MEDICINE

## 2022-08-29 PROCEDURE — 99214 OFFICE O/P EST MOD 30 MIN: CPT | Performed by: FAMILY MEDICINE

## 2022-08-29 PROCEDURE — 90471 IMMUNIZATION ADMIN: CPT | Performed by: FAMILY MEDICINE

## 2022-08-29 SDOH — ECONOMIC STABILITY: FOOD INSECURITY: WITHIN THE PAST 12 MONTHS, YOU WORRIED THAT YOUR FOOD WOULD RUN OUT BEFORE YOU GOT MONEY TO BUY MORE.: NEVER TRUE

## 2022-08-29 SDOH — ECONOMIC STABILITY: FOOD INSECURITY: WITHIN THE PAST 12 MONTHS, THE FOOD YOU BOUGHT JUST DIDN'T LAST AND YOU DIDN'T HAVE MONEY TO GET MORE.: NEVER TRUE

## 2022-08-29 ASSESSMENT — SOCIAL DETERMINANTS OF HEALTH (SDOH): HOW HARD IS IT FOR YOU TO PAY FOR THE VERY BASICS LIKE FOOD, HOUSING, MEDICAL CARE, AND HEATING?: NOT HARD AT ALL

## 2022-08-29 NOTE — PROGRESS NOTES
2022    Blood pressure 130/70, pulse 88, resp. rate 18, height 5' 5\" (1.651 m), weight 280 lb (127 kg), last menstrual period 2021, SpO2 97 %, not currently breastfeeding. Darryle Heard (:  1970) is a 50 y.o. female, here for evaluation of the following medical concerns:    Chief Complaint   Patient presents with    Diabetes     Pt is here for a 3 month follow up on diabetes. Pt is good. Has completed chemo for bladder cancer. All clear thus far. Had chemo/radiation, but no surgery. DM-2: she stopped actos 15 mg due to possible link to bladder cancer. Was added because steroids were causing hyperglycemia during treatment for bladder cancer. 'I just can't bring myself to take it.'    Current diabetes meds: metformin 1000 bid, amaryl 4, trulicity 3    Home sugars:     A1c today is 7.9%  Weight has increased significantly over time of treatment. 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 No hx of CAD, TIA, CVA or PVD. Last renal function test:   Lab Results   Component Value Date/Time     2022 09:53 AM    K 4.4 2022 09:53 AM    K 4.0 2022 12:12 PM    BUN 16 2022 09:53 AM    CREATININE 0.6 2022 09:53 AM     Estimated Creatinine Clearance: 151 mL/min (based on SCr of 0.6 mg/dL). Statin of use is Crestor. Last lipid test:  Lab Results   Component Value Date    CHOL 168 2022    TRIG 102 2022    HDL 54 2022    LDLCALC 94 2022     Lab Results   Component Value Date    ALT 11 2022    AST 10 (L) 2022       Mild cough lingers from COVID infection 22. Sputum is clear. Energy improving  Took paxlovid. She is due for some vaccines. Anxiety:  No longer using ativan with completion of cancer treatment. (Keeps it on hand just in case). Takes zoloft daily. Sleep has been good most days. Helps mild depression also. Patient Active Problem List   Diagnosis    Allergic rhinitis    Vitamin D deficiency    Type 2 diabetes mellitus without complication ([facility] [address] dx 9/63. Obesity, morbid, BMI 40.0-49.9 (HCC)    Pure hypercholesterolemia    Oropharyngeal dysphagia    Primary hypertension    Anxiety in acute stress reaction        Body mass index is 46.59 kg/m². Wt Readings from Last 3 Encounters:   08/29/22 280 lb (127 kg)   05/16/22 274 lb (124.3 kg)   02/14/22 258 lb (117 kg)       BP Readings from Last 3 Encounters:   08/29/22 130/70   05/16/22 120/84   02/14/22 (!) 140/80       Allergies   Allergen Reactions    Tetracyclines & Related        Prior to Visit Medications    Medication Sig Taking? Authorizing Provider   [facility] SOPN INJECT 3 MG UNDER THE SKIN ONCE A WEEK Yes Anoop Sapp MD   sertraline (ZOLOFT) 50 MG tablet TAKE 1 TABLET BY MOUTH DAILY Yes Anoop Sapp MD   LORazepam (ATIVAN) 0.5 MG tablet Lorazepam Oral        active Yes Historical Provider, MD   metoprolol succinate (TOPROL XL) 50 MG extended release tablet Take 1 tablet by mouth daily Yes Anoop Sapp MD   glimepiride (AMARYL) 4 MG tablet TAKE 1 TABLET BY MOUTH EVERY MORNING Yes Anoop Sapp MD   metFORMIN (GLUCOPHAGE) 1000 MG tablet TAKE 1 TABLET BY MOUTH TWICE DAILY WITH MEALS Yes Anoop Sapp MD   rosuvastatin (CRESTOR) 20 MG tablet TAKE 1 TABLET BY MOUTH DAILY Yes Anoop Sapp MD   Blood Glucose Monitoring Suppl (TRUE METRIX METER) SAJI 1 Device by Does not apply route daily Yes LUCIANO Christina CNP   glucose blood VI test strips (TRUE METRIX BLOOD GLUCOSE TEST) strip 1 each by In Vitro route daily As needed.  Yes LUCIANO Christina CNP   FREESTYLE LANCETS MISC TEST DAILY Yes Anoop Sapp MD   FREESTYLE LITE strip TEST EVERY DAY Yes Fabi Taylor Opal Rivera MD   glucose monitoring kit (FREESTYLE) monitoring kit 1 kit by Does not apply route daily as needed Yes Sharon Monge MD        Social History     Tobacco Use    Smoking status: Former     Packs/day: 1.00     Years: 19.00     Pack years: 19.00     Types: Cigarettes     Start date: 1/1/1988     Quit date: 1/1/2007     Years since quitting: 15.6    Smokeless tobacco: Never   Substance Use Topics    Alcohol use: Yes     Alcohol/week: 0.0 standard drinks     Comment: rarely    Drug use: No       Review of Systems No chest pains, dizziness, heart palpitations, dyspnea, lightheadedness, worsening edema. Physical Exam  Vitals and nursing note reviewed. Constitutional:       Appearance: Normal appearance. She is well-developed. Neck:      Thyroid: No thyromegaly. Cardiovascular:      Rate and Rhythm: Normal rate and regular rhythm. Pulses: Normal pulses. Heart sounds: Normal heart sounds. No murmur heard. Pulmonary:      Effort: Pulmonary effort is normal. No respiratory distress. Breath sounds: Normal breath sounds. No wheezing or rales. Musculoskeletal:         General: Normal range of motion. Cervical back: Normal range of motion. Comments: Feet: no lesions or significant deformity. Sensation intact to SW monofilament and vibratory sense bilaterally; intact pedal pulses and tissue perfusion. Skin:     General: Skin is warm and dry. Neurological:      General: No focal deficit present. Mental Status: She is alert and oriented to person, place, and time. Mental status is at baseline. Psychiatric:         Mood and Affect: Mood normal.         Behavior: Behavior normal.         Thought Content: Thought content normal.         Judgment: Judgment normal.       ASSESSMENT/PLAN:    1.  Type 2 diabetes mellitus without complication, without long-term current use of insulin ([facility])  - POCT glycosylated hemoglobin (Hb A1C) 7.9.  not to goal. Stopped Actos early June.  She wishes to work on diet and reduce sweets in diet to reduce glucoses and weight. Continue current medications and treatment plan. Recheck a1c in 3 mo    2. Primary hypertension  blood pressure     3. Pure hypercholesterolemia  - Stable; continue statin therapy    4. Anxiety in acute stress reaction  - Stable; continue zoloft    5. Obesity, morbid, BMI 40.0-49.9 ([address]  - we discussed ways to reduce calories, toan in sweets. 6. Screen for colon cancer  - discussed CRC screening options (including colonoscopy being the 'gold standard'); pt elects to proceed with Cologuard fecal DNA after discussing the test and the process. - Fecal DNA Colorectal cancer screening (Cologuard)    Shingrix and Tdap given today. Will need hep B and strep pneumo vaccines later. Due for mammogram completion. F/u 3 mo    An  electronicsignature was used to authenticate this note.     --Carla Pearson MD on 8/29/2022 at 11:09 AM

## 2022-10-03 RX ORDER — ROSUVASTATIN CALCIUM 20 MG/1
20 TABLET, COATED ORAL DAILY
Qty: 90 TABLET | Refills: 1 | Status: SHIPPED | OUTPATIENT
Start: 2022-10-03

## 2022-10-17 RX ORDER — METOPROLOL SUCCINATE 50 MG/1
50 TABLET, EXTENDED RELEASE ORAL DAILY
Qty: 90 TABLET | Refills: 1 | Status: SHIPPED | OUTPATIENT
Start: 2022-10-17

## 2022-10-18 DIAGNOSIS — E11.9 TYPE 2 DIABETES MELLITUS WITHOUT COMPLICATION, WITHOUT LONG-TERM CURRENT USE OF INSULIN (HCC): ICD-10-CM

## 2022-11-17 RX ORDER — GLIMEPIRIDE 4 MG/1
4 TABLET ORAL EVERY MORNING
Qty: 90 TABLET | Refills: 1 | Status: SHIPPED | OUTPATIENT
Start: 2022-11-17

## 2023-01-16 ENCOUNTER — TELEPHONE (OUTPATIENT)
Dept: FAMILY MEDICINE CLINIC | Age: 53
End: 2023-01-16

## 2023-01-16 NOTE — TELEPHONE ENCOUNTER
Pt wants to know can she switch to ozempic?   Pt cannot get the trulicity 3.0 it is out of stock pt was due for a dose this past Saturday   Pt has a procedure coming up and is concerned about her blood sugar  Verified pharmacy       Please call to advise the pt

## 2023-02-06 ENCOUNTER — HOSPITAL ENCOUNTER (OUTPATIENT)
Dept: CT IMAGING | Age: 53
Discharge: HOME | End: 2023-02-06
Payer: COMMERCIAL

## 2023-02-06 DIAGNOSIS — C67.6 MALIGNANT NEOPLASM OF URETERIC ORIFICE (HCC): ICD-10-CM

## 2023-02-06 PROCEDURE — 6360000004 HC RX CONTRAST MEDICATION: Performed by: INTERNAL MEDICINE

## 2023-02-06 PROCEDURE — 71260 CT THORAX DX C+: CPT

## 2023-02-06 RX ADMIN — IOPAMIDOL 50 ML: 612 INJECTION, SOLUTION INTRAVENOUS at 11:01

## 2023-02-06 RX ADMIN — IOPAMIDOL 75 ML: 755 INJECTION, SOLUTION INTRAVENOUS at 11:01

## 2023-02-09 ENCOUNTER — OFFICE VISIT (OUTPATIENT)
Dept: FAMILY MEDICINE CLINIC | Age: 53
End: 2023-02-09

## 2023-02-09 VITALS
OXYGEN SATURATION: 95 % | SYSTOLIC BLOOD PRESSURE: 120 MMHG | HEART RATE: 76 BPM | WEIGHT: 270.8 LBS | RESPIRATION RATE: 16 BRPM | BODY MASS INDEX: 45.12 KG/M2 | HEIGHT: 65 IN | DIASTOLIC BLOOD PRESSURE: 82 MMHG

## 2023-02-09 DIAGNOSIS — F41.1 ANXIETY IN ACUTE STRESS REACTION: ICD-10-CM

## 2023-02-09 DIAGNOSIS — I10 PRIMARY HYPERTENSION: ICD-10-CM

## 2023-02-09 DIAGNOSIS — F43.0 ANXIETY IN ACUTE STRESS REACTION: ICD-10-CM

## 2023-02-09 DIAGNOSIS — Z12.31 ENCOUNTER FOR SCREENING MAMMOGRAM FOR BREAST CANCER: ICD-10-CM

## 2023-02-09 DIAGNOSIS — E11.9 TYPE 2 DIABETES MELLITUS WITHOUT COMPLICATION, WITHOUT LONG-TERM CURRENT USE OF INSULIN (HCC): Primary | ICD-10-CM

## 2023-02-09 LAB — HBA1C MFR BLD: 8.8 %

## 2023-02-09 RX ORDER — TIRZEPATIDE 2.5 MG/.5ML
2.5 INJECTION, SOLUTION SUBCUTANEOUS WEEKLY
Qty: 4 ADJUSTABLE DOSE PRE-FILLED PEN SYRINGE | Refills: 0 | Status: SHIPPED | OUTPATIENT
Start: 2023-02-09

## 2023-02-09 RX ORDER — TIRZEPATIDE 5 MG/.5ML
5 INJECTION, SOLUTION SUBCUTANEOUS WEEKLY
Qty: 4 ADJUSTABLE DOSE PRE-FILLED PEN SYRINGE | Refills: 3 | Status: SHIPPED | OUTPATIENT
Start: 2023-02-09

## 2023-02-09 SDOH — ECONOMIC STABILITY: FOOD INSECURITY: WITHIN THE PAST 12 MONTHS, YOU WORRIED THAT YOUR FOOD WOULD RUN OUT BEFORE YOU GOT MONEY TO BUY MORE.: NEVER TRUE

## 2023-02-09 SDOH — ECONOMIC STABILITY: HOUSING INSECURITY
IN THE LAST 12 MONTHS, WAS THERE A TIME WHEN YOU DID NOT HAVE A STEADY PLACE TO SLEEP OR SLEPT IN A SHELTER (INCLUDING NOW)?: NO

## 2023-02-09 SDOH — ECONOMIC STABILITY: INCOME INSECURITY: HOW HARD IS IT FOR YOU TO PAY FOR THE VERY BASICS LIKE FOOD, HOUSING, MEDICAL CARE, AND HEATING?: NOT HARD AT ALL

## 2023-02-09 SDOH — ECONOMIC STABILITY: FOOD INSECURITY: WITHIN THE PAST 12 MONTHS, THE FOOD YOU BOUGHT JUST DIDN'T LAST AND YOU DIDN'T HAVE MONEY TO GET MORE.: NEVER TRUE

## 2023-02-09 ASSESSMENT — PATIENT HEALTH QUESTIONNAIRE - PHQ9
SUM OF ALL RESPONSES TO PHQ QUESTIONS 1-9: 0
SUM OF ALL RESPONSES TO PHQ QUESTIONS 1-9: 0
2. FEELING DOWN, DEPRESSED OR HOPELESS: 0
SUM OF ALL RESPONSES TO PHQ QUESTIONS 1-9: 0
SUM OF ALL RESPONSES TO PHQ QUESTIONS 1-9: 0
SUM OF ALL RESPONSES TO PHQ9 QUESTIONS 1 & 2: 0
1. LITTLE INTEREST OR PLEASURE IN DOING THINGS: 0

## 2023-02-09 NOTE — PROGRESS NOTES
2023    Blood pressure 120/82, pulse 76, resp. rate 16, height 5' 5\" (1.651 m), weight 270 lb 12.8 oz (122.8 kg), last menstrual period 2021, SpO2 95 %, not currently breastfeeding. Lalitha Lopez (:  1970) is a 48 y.o. female, here for evaluation of the following medical concerns:    Chief Complaint   Patient presents with    Hypertension     Follow-up, having issues with Trulicity. Here for routine follow up of DM, HTN. She has completed bladder cancer treatment and is cancer free so far. Back to work. DM-2, no complications. Dififulty with GLP-1 backorder. Off trulicity for a month due to availability. Also taking amaryl 4 mg, metformin 1000mg. [place] [facility] A1c is higher today:     Lab Results   Component Value Date/Time    LABA1C 8.8 2023 02:24 PM    LABA1C 7.9 2022 11:12 AM    LABA1C 6.9 2022 09:32 AM      She is happy weight is down 10 lbs. Bp looks good. Takes toprol only. No hx proteinuria. Last renal function test:   Lab Results   Component Value Date/Time     2022 09:53 AM    K 4.4 2022 09:53 AM    K 4.0 2022 12:12 PM    BUN 16 2022 09:53 AM    CREATININE 0.6 2022 09:53 AM     CrCl cannot be calculated (Patient's most recent lab result is older than the maximum 180 days allowed. ). No longer using ativan. Still on Zoloft for anxiety. Patient Active Problem List   Diagnosis    Allergic rhinitis    Vitamin D deficiency    Type 2 diabetes mellitus without complication ([facility] [address] dx . Obesity, morbid, BMI 40.0-49.9 ([facility])    Pure hypercholesterolemia    Oropharyngeal dysphagia    Primary hypertension    Anxiety in acute stress reaction        Body mass index is 45.06 kg/m².     Wt Readings from Last 3 Encounters:   23 270 lb 12.8 oz (122.8 kg)   22 280 lb (127 kg)   22 274 lb (124.3 kg)       BP Readings from Last 3 Encounters:   23 120/82   22 130/70   22 120/84       Allergies   Allergen Reactions    Tetracyclines & Related        Prior to Visit Medications    Medication Sig Taking? Authorizing Provider   glimepiride (AMARYL) 4 MG tablet TAKE 1 TABLET BY MOUTH EVERY MORNING Yes Fito Allen MD   metFORMIN (GLUCOPHAGE) 1000 MG tablet TAKE 1 TABLET BY MOUTH TWICE DAILY WITH MEALS Yes Fito Allen MD   metoprolol succinate (TOPROL XL) 50 MG extended release tablet TAKE 1 TABLET BY MOUTH DAILY Yes Fito Allen MD   rosuvastatin (CRESTOR) 20 MG tablet TAKE 1 TABLET BY MOUTH DAILY Yes Fito Allen MD   TRULICITY 3 DK/8.9CI SOPN INJECT 3 MG UNDER THE SKIN ONCE A WEEK Yes Fito Allen MD   sertraline (ZOLOFT) 50 MG tablet TAKE 1 TABLET BY MOUTH DAILY Yes Fito Allen MD   LORazepam (ATIVAN) 0.5 MG tablet Lorazepam Oral        active Yes Historical Provider, MD   Blood Glucose Monitoring Suppl (TRUE METRIX METER) SAJI 1 Device by Does not apply route daily Yes LUCIANO Villar CNP   glucose blood VI test strips (TRUE METRIX BLOOD GLUCOSE TEST) strip 1 each by In Vitro route daily As needed. Yes LUCIANO Villar CNP   FREESTYLE LANCETS MISC TEST DAILY Yes Fito Allen MD   FREESTYLE LITE strip TEST EVERY DAY Yes Fito Allen MD   glucose monitoring kit (FREESTYLE) monitoring kit 1 kit by Does not apply route daily as needed Yes Fito Allen MD   Semaglutide,0.25 or 0.5MG/DOS, 2 MG/1.5ML SOPN Inject 0.25 mg into the skin once a week  Fito Allen MD        Social History     Tobacco Use    Smoking status: Former     Packs/day: 1.00     Years: 19.00     Pack years: 19.00     Types: Cigarettes     Start date: 1988     Quit date: 2007     Years since quittin.1    Smokeless tobacco: Never   Substance Use Topics    Alcohol use:  Yes     Alcohol/week: 0.0 standard drinks     Comment: rarely    Drug use: No       Review of Systems No chest pains, dizziness, heart palpitations, dyspnea, lightheadedness, worsening edema. Physical Exam  Vitals and nursing note reviewed. Constitutional:       Appearance: Normal appearance. She is well-developed. Neck:      Thyroid: No thyromegaly. Cardiovascular:      Rate and Rhythm: Normal rate and regular rhythm. Pulses: Normal pulses. Heart sounds: Normal heart sounds. No murmur heard. Pulmonary:      Effort: Pulmonary effort is normal. No respiratory distress. Breath sounds: Normal breath sounds. No wheezing or rales. Musculoskeletal:         General: Normal range of motion. Cervical back: Normal range of motion. Skin:     General: Skin is warm and dry. Neurological:      General: No focal deficit present. Mental Status: She is alert and oriented to person, place, and time. Mental status is at baseline. Psychiatric:         Mood and Affect: Mood normal.         Behavior: Behavior normal.         Thought Content: Thought content normal.         Judgment: Judgment normal.       ASSESSMENT/PLAN:    1. Type 2 diabetes mellitus without complication, without long-term current use of insulin (HCC)  - a1c higher due to lack of GLP-1, likely. Try Mounjeo 2.5 then 5 mg after a month. Continue metformin, amaryl  - POCT glycosylated hemoglobin (Hb A1C)    2. Encounter for screening mammogram for breast cancer  - re-ordered mammogram.  - NII DIGITAL SCREEN W OR WO CAD BILATERAL; Future    3. Primary hypertension  - Blood pressure is at goal on current therapy; continue meds and monitoring. Regular physical activity and healthy diet (low sodium, multiple servings of produce daily) was recommended. Renal function to be assessed yearly. 4. Anxiety in acute stress reaction  - Stable; continue Zoloft at current dose. Not using ativan    Reminded to complete cologuard. Return in about 3 months (around 5/9/2023) for follow up diabetes.     An  Ore[employer]ignature was used to authenticate this note.    --Cinthya Guadalupe MD on 2/9/2023 at 2:24 PM

## 2023-03-20 RX ORDER — TIRZEPATIDE 2.5 MG/.5ML
INJECTION, SOLUTION SUBCUTANEOUS
Qty: 2 ML | Refills: 3 | Status: SHIPPED | OUTPATIENT
Start: 2023-03-20

## 2023-03-27 RX ORDER — ROSUVASTATIN CALCIUM 20 MG/1
20 TABLET, COATED ORAL DAILY
Qty: 90 TABLET | Refills: 1 | Status: SHIPPED | OUTPATIENT
Start: 2023-03-27

## 2023-04-04 ENCOUNTER — TELEPHONE (OUTPATIENT)
Dept: FAMILY MEDICINE CLINIC | Age: 53
End: 2023-04-04

## 2023-04-04 RX ORDER — TIRZEPATIDE 5 MG/.5ML
5 INJECTION, SOLUTION SUBCUTANEOUS WEEKLY
Qty: 4 ADJUSTABLE DOSE PRE-FILLED PEN SYRINGE | Refills: 3 | Status: SHIPPED | OUTPATIENT
Start: 2023-04-04 | End: 2023-05-11 | Stop reason: DRUGHIGH

## 2023-04-13 PROBLEM — C67.6 MALIGNANT NEOPLASM OF URETERIC ORIFICE (HCC): Status: ACTIVE | Noted: 2023-04-13

## 2023-05-11 ENCOUNTER — OFFICE VISIT (OUTPATIENT)
Dept: FAMILY MEDICINE CLINIC | Age: 53
End: 2023-05-11
Payer: COMMERCIAL

## 2023-05-11 VITALS
WEIGHT: 261 LBS | BODY MASS INDEX: 43.49 KG/M2 | SYSTOLIC BLOOD PRESSURE: 120 MMHG | RESPIRATION RATE: 14 BRPM | DIASTOLIC BLOOD PRESSURE: 82 MMHG | HEART RATE: 81 BPM | HEIGHT: 65 IN | OXYGEN SATURATION: 96 %

## 2023-05-11 DIAGNOSIS — I10 PRIMARY HYPERTENSION: ICD-10-CM

## 2023-05-11 DIAGNOSIS — E11.9 TYPE 2 DIABETES MELLITUS WITHOUT COMPLICATION, WITHOUT LONG-TERM CURRENT USE OF INSULIN (HCC): ICD-10-CM

## 2023-05-11 DIAGNOSIS — E66.01 OBESITY, MORBID, BMI 40.0-49.9 (HCC): ICD-10-CM

## 2023-05-11 DIAGNOSIS — E78.00 PURE HYPERCHOLESTEROLEMIA: ICD-10-CM

## 2023-05-11 DIAGNOSIS — G47.62 NOCTURNAL LEG CRAMPS: ICD-10-CM

## 2023-05-11 DIAGNOSIS — E11.9 TYPE 2 DIABETES MELLITUS WITHOUT COMPLICATION, WITHOUT LONG-TERM CURRENT USE OF INSULIN (HCC): Primary | ICD-10-CM

## 2023-05-11 LAB
ALBUMIN SERPL-MCNC: 3.7 G/DL (ref 3.4–5)
ALBUMIN/GLOB SERPL: 1.1 {RATIO} (ref 1.1–2.2)
ALP SERPL-CCNC: 102 U/L (ref 40–129)
ALT SERPL-CCNC: 12 U/L (ref 10–40)
ANION GAP SERPL CALCULATED.3IONS-SCNC: 12 MMOL/L (ref 3–16)
AST SERPL-CCNC: 12 U/L (ref 15–37)
BILIRUB SERPL-MCNC: <0.2 MG/DL (ref 0–1)
BUN SERPL-MCNC: 11 MG/DL (ref 7–20)
CALCIUM SERPL-MCNC: 9 MG/DL (ref 8.3–10.6)
CHLORIDE SERPL-SCNC: 107 MMOL/L (ref 99–110)
CHOLEST SERPL-MCNC: 111 MG/DL (ref 0–199)
CO2 SERPL-SCNC: 23 MMOL/L (ref 21–32)
CREAT SERPL-MCNC: 0.6 MG/DL (ref 0.6–1.1)
CREAT UR-MCNC: 232.5 MG/DL (ref 28–259)
FERRITIN SERPL IA-MCNC: 121.1 NG/ML (ref 15–150)
GFR SERPLBLD CREATININE-BSD FMLA CKD-EPI: >60 ML/MIN/{1.73_M2}
GLUCOSE SERPL-MCNC: 160 MG/DL (ref 70–99)
HDLC SERPL-MCNC: 28 MG/DL (ref 40–60)
LDLC SERPL CALC-MCNC: 65 MG/DL
MAGNESIUM SERPL-MCNC: 1.3 MG/DL (ref 1.8–2.4)
MICROALBUMIN UR DL<=1MG/L-MCNC: <1.2 MG/DL
MICROALBUMIN/CREAT UR: NORMAL MG/G (ref 0–30)
POTASSIUM SERPL-SCNC: 4 MMOL/L (ref 3.5–5.1)
PROT SERPL-MCNC: 7.1 G/DL (ref 6.4–8.2)
SODIUM SERPL-SCNC: 142 MMOL/L (ref 136–145)
TRIGL SERPL-MCNC: 89 MG/DL (ref 0–150)
VLDLC SERPL CALC-MCNC: 18 MG/DL

## 2023-05-11 PROCEDURE — 3078F DIAST BP <80 MM HG: CPT | Performed by: FAMILY MEDICINE

## 2023-05-11 PROCEDURE — 3074F SYST BP LT 130 MM HG: CPT | Performed by: FAMILY MEDICINE

## 2023-05-11 PROCEDURE — 3052F HG A1C>EQUAL 8.0%<EQUAL 9.0%: CPT | Performed by: FAMILY MEDICINE

## 2023-05-11 PROCEDURE — 99214 OFFICE O/P EST MOD 30 MIN: CPT | Performed by: FAMILY MEDICINE

## 2023-05-11 RX ORDER — TIRZEPATIDE 7.5 MG/.5ML
7.5 INJECTION, SOLUTION SUBCUTANEOUS WEEKLY
Qty: 4 ADJUSTABLE DOSE PRE-FILLED PEN SYRINGE | Refills: 3 | Status: SHIPPED | OUTPATIENT
Start: 2023-05-11

## 2023-05-11 NOTE — PROGRESS NOTES
2023    Blood pressure 120/82, pulse 81, resp. rate 14, height 5' 5\" (1.651 m), weight 261 lb (118.4 kg), last menstrual period 2021, SpO2 96 %, not currently breastfeeding. Arjun Suh (:  1970) is a 48 y.o. female, here for evaluation of the following medical concerns:    Chief Complaint   Patient presents with    Diabetes    Other     Muscle cramping, would like to have Magnesium levels checked     Here for routine follow up of diabetes. On Mounjaro 5. Metformin 1000/d (higher doses cause diarrhea), amaryl 4.   wt down 10 lbs in past 3 mo  Has stopped eating candy. Still eats pretzels and sweets. Still some binging at night. Am sugars: 120-150. After meals up to 190. Lab Results   Component Value Date/Time    LABA1C 8.8 2023 02:24 PM    LABA1C 7.9 2022 11:12 AM    LABA1C 6.9 2022 09:32 AM      She has noted more leg cramps at night- wants magnesium tested. Painful teresa horse cramps. Usually at night. Not on diuretics. No ppi use. Drinks plenty of water. Not dehydrated. On Crestor 20  Last lipid test:  Lab Results   Component Value Date    CHOL 168 2022    TRIG 102 2022    HDL 54 2022    LDLCALC 94 2022     Lab Results   Component Value Date    ALT 11 2022    AST 10 (L) 2022       Bp meds: toprol 50. Bp stable today. Does not test at home. Normal renal fxn. No hx proteinuria    Patient Active Problem List   Diagnosis    Allergic rhinitis    Vitamin D deficiency    Type 2 diabetes mellitus without complication ([address] dx . Obesity, morbid, BMI 40.0-49.9 ([address]    Pure hypercholesterolemia    Oropharyngeal dysphagia    Primary hypertension    Anxiety in acute stress reaction    Malignant neoplasm of ureteric orifice ([facility] [address] dx         Body mass index is 43.43 kg/m².     Wt Readings from Last 3 Encounters:   23 261 lb (118.4 kg)   23 262 lb (118.8 kg)   23 270 lb 12.8 oz (122.8 kg)       BP

## 2023-05-12 LAB
EST. AVERAGE GLUCOSE BLD GHB EST-MCNC: 205.9 MG/DL
HBA1C MFR BLD: 8.8 %

## 2023-05-15 RX ORDER — METOPROLOL SUCCINATE 50 MG/1
50 TABLET, EXTENDED RELEASE ORAL DAILY
Qty: 90 TABLET | Refills: 1 | Status: SHIPPED | OUTPATIENT
Start: 2023-05-15

## 2023-06-26 RX ORDER — GLIMEPIRIDE 4 MG/1
4 TABLET ORAL EVERY MORNING
Qty: 90 TABLET | Refills: 1 | Status: SHIPPED | OUTPATIENT
Start: 2023-06-26

## 2023-07-25 ENCOUNTER — OFFICE VISIT (OUTPATIENT)
Dept: FAMILY MEDICINE CLINIC | Age: 53
End: 2023-07-25
Payer: COMMERCIAL

## 2023-07-25 VITALS
DIASTOLIC BLOOD PRESSURE: 80 MMHG | TEMPERATURE: 98 F | WEIGHT: 267 LBS | RESPIRATION RATE: 18 BRPM | OXYGEN SATURATION: 94 % | SYSTOLIC BLOOD PRESSURE: 140 MMHG | HEIGHT: 65 IN | BODY MASS INDEX: 44.48 KG/M2 | HEART RATE: 90 BPM

## 2023-07-25 DIAGNOSIS — R30.0 DYSURIA: Primary | ICD-10-CM

## 2023-07-25 PROCEDURE — 3079F DIAST BP 80-89 MM HG: CPT | Performed by: FAMILY MEDICINE

## 2023-07-25 PROCEDURE — 99213 OFFICE O/P EST LOW 20 MIN: CPT | Performed by: FAMILY MEDICINE

## 2023-07-25 PROCEDURE — 3077F SYST BP >= 140 MM HG: CPT | Performed by: FAMILY MEDICINE

## 2023-07-25 RX ORDER — CHOLECALCIFEROL (VITAMIN D3) 125 MCG
500 CAPSULE ORAL DAILY
COMMUNITY

## 2023-07-25 RX ORDER — ACETAMINOPHEN 160 MG
50 TABLET,DISINTEGRATING ORAL 3 TIMES DAILY
COMMUNITY

## 2023-07-25 RX ORDER — SULFAMETHOXAZOLE AND TRIMETHOPRIM 800; 160 MG/1; MG/1
1 TABLET ORAL 2 TIMES DAILY
Qty: 20 TABLET | Refills: 0 | Status: SHIPPED | OUTPATIENT
Start: 2023-07-25 | End: 2023-08-04

## 2023-07-27 LAB
BACTERIA UR CULT: ABNORMAL
ORGANISM: ABNORMAL

## 2023-08-04 ENCOUNTER — HOSPITAL ENCOUNTER (OUTPATIENT)
Dept: CT IMAGING | Age: 53
Discharge: HOME | End: 2023-08-04
Attending: INTERNAL MEDICINE
Payer: COMMERCIAL

## 2023-08-04 DIAGNOSIS — C67.6 MALIGNANT NEOPLASM OF URETERIC ORIFICE (HCC): ICD-10-CM

## 2023-08-04 PROCEDURE — 6360000004 HC RX CONTRAST MEDICATION: Performed by: INTERNAL MEDICINE

## 2023-08-04 PROCEDURE — 71260 CT THORAX DX C+: CPT

## 2023-08-04 RX ADMIN — IOPAMIDOL 75 ML: 755 INJECTION, SOLUTION INTRAVENOUS at 10:53

## 2023-08-04 RX ADMIN — IOPAMIDOL 50 ML: 612 INJECTION, SOLUTION INTRAVENOUS at 10:53

## 2023-08-07 ENCOUNTER — TELEPHONE (OUTPATIENT)
Dept: FAMILY MEDICINE CLINIC | Age: 53
End: 2023-08-07

## 2023-08-07 NOTE — TELEPHONE ENCOUNTER
Called and lvm for patient with doctor advise. Patient to call back to see if she is having any other symptoms.

## 2023-08-07 NOTE — TELEPHONE ENCOUNTER
I would not recommend retreating if only symptom seen is the blood. That can have other causes. If she has urine frequency or pain with urination or urgency or lower abd pain, then we will need to repeat her urine test.  Thanks.

## 2023-08-07 NOTE — TELEPHONE ENCOUNTER
Pt was put on Bactrim 800-160mg and finish it up on Friday  This morning when she wiped there was pink-enough blood to show pink and one drop was red- can not see the blood in the toilet bowl but on the toilet paper.    Otherwise all the other UTI symptoms have gone away  Drinking plenty of water  [facility] on [facility]

## 2023-08-11 ENCOUNTER — OFFICE VISIT (OUTPATIENT)
Dept: FAMILY MEDICINE CLINIC | Age: 53
End: 2023-08-11
Payer: COMMERCIAL

## 2023-08-11 VITALS
HEART RATE: 84 BPM | WEIGHT: 271 LBS | SYSTOLIC BLOOD PRESSURE: 126 MMHG | BODY MASS INDEX: 45.15 KG/M2 | RESPIRATION RATE: 10 BRPM | HEIGHT: 65 IN | DIASTOLIC BLOOD PRESSURE: 80 MMHG

## 2023-08-11 DIAGNOSIS — E11.9 TYPE 2 DIABETES MELLITUS WITHOUT COMPLICATION, WITHOUT LONG-TERM CURRENT USE OF INSULIN (HCC): Primary | ICD-10-CM

## 2023-08-11 DIAGNOSIS — N93.9 VAGINAL BLEEDING: ICD-10-CM

## 2023-08-11 LAB — HBA1C MFR BLD: 8.1 %

## 2023-08-11 PROCEDURE — 83037 HB GLYCOSYLATED A1C HOME DEV: CPT | Performed by: FAMILY MEDICINE

## 2023-08-11 PROCEDURE — 3052F HG A1C>EQUAL 8.0%<EQUAL 9.0%: CPT | Performed by: FAMILY MEDICINE

## 2023-08-11 PROCEDURE — 99214 OFFICE O/P EST MOD 30 MIN: CPT | Performed by: FAMILY MEDICINE

## 2023-08-11 PROCEDURE — 3074F SYST BP LT 130 MM HG: CPT | Performed by: FAMILY MEDICINE

## 2023-08-11 PROCEDURE — 3079F DIAST BP 80-89 MM HG: CPT | Performed by: FAMILY MEDICINE

## 2023-08-11 RX ORDER — TIRZEPATIDE 10 MG/.5ML
10 INJECTION, SOLUTION SUBCUTANEOUS WEEKLY
Qty: 4 ADJUSTABLE DOSE PRE-FILLED PEN SYRINGE | Refills: 3 | Status: SHIPPED | OUTPATIENT
Start: 2023-08-11

## 2023-08-11 NOTE — PROGRESS NOTES
Readings from Last 3 Encounters:   08/11/23 271 lb (122.9 kg)   07/25/23 267 lb (121.1 kg)   05/11/23 261 lb (118.4 kg)       BP Readings from Last 3 Encounters:   08/11/23 126/80   07/25/23 (!) 140/80   05/11/23 120/82       Allergies   Allergen Reactions    Tetracyclines & Related        Prior to Visit Medications    Medication Sig Taking? Authorizing Provider   vitamin B-12 (CYANOCOBALAMIN) 500 MCG tablet Take 1 tablet by mouth daily Yes Historical Provider, MD   Cholecalciferol (VITAMIN D3) 50 MCG (2000 UT) CAPS 50 capsules in the morning, at noon, and at bedtime Yes Historical Provider, MD   glimepiride (AMARYL) 4 MG tablet TAKE 1 TABLET BY MOUTH EVERY MORNING Yes Nicole Montalvo MD   metoprolol succinate (TOPROL XL) 50 MG extended release tablet TAKE 1 TABLET BY MOUTH DAILY Yes Nicole Montalvo MD   Tirzepatide [facility]) 7.5 MG/0.5ML SOPN SC injection Inject 0.5 mLs into the skin once a week Yes Nicole Montalvo MD   Omega-3 1000 MG CAPS Take 1 capsule by mouth daily Yes Historical Provider, MD   rosuvastatin (CRESTOR) 20 MG tablet TAKE 1 TABLET BY MOUTH DAILY Yes Nicole Montalvo MD   sertraline (ZOLOFT) 50 MG tablet TAKE 1 TABLET BY MOUTH DAILY Yes Nicole Montalvo MD   metFORMIN (GLUCOPHAGE) 1000 MG tablet TAKE 1 TABLET BY MOUTH TWICE DAILY WITH MEALS  Patient taking differently: 1 tablet daily (with breakfast) Yes Nicole Montalvo MD   Blood Glucose Monitoring Suppl (TRUE METRIX METER) SAJI 1 Device by Does not apply route daily Yes LUCIANO Trevino CNP   glucose blood VI test strips (TRUE METRIX BLOOD GLUCOSE TEST) strip 1 each by In Vitro route daily As needed.  Yes LUCIANO Trevino CNP   FREESTYLE LANCETS MISC TEST DAILY Yes Nicole Montalvo MD   FREESTYLE LITE strip TEST EVERY DAY Yes Nicole Montalvo MD   glucose monitoring kit (FREESTYLE) monitoring kit 1 kit by Does not apply route daily as needed Yes Nicole Montalvo MD        Social

## 2023-08-16 ENCOUNTER — HOSPITAL ENCOUNTER (OUTPATIENT)
Dept: ULTRASOUND IMAGING | Age: 53
Discharge: HOME | End: 2023-08-16
Attending: FAMILY MEDICINE
Payer: COMMERCIAL

## 2023-08-16 DIAGNOSIS — N93.9 VAGINAL BLEEDING: ICD-10-CM

## 2023-08-16 PROCEDURE — 76830 TRANSVAGINAL US NON-OB: CPT

## 2023-08-16 PROCEDURE — 76856 US EXAM PELVIC COMPLETE: CPT

## 2023-08-21 ENCOUNTER — OFFICE VISIT (OUTPATIENT)
Dept: GYNECOLOGY | Age: 53
End: 2023-08-21
Payer: COMMERCIAL

## 2023-08-21 VITALS — DIASTOLIC BLOOD PRESSURE: 80 MMHG | SYSTOLIC BLOOD PRESSURE: 134 MMHG | WEIGHT: 269.2 LBS | BODY MASS INDEX: 44.8 KG/M2

## 2023-08-21 DIAGNOSIS — Z12.11 ENCOUNTER FOR SCREENING FOR MALIGNANT NEOPLASM OF COLON: ICD-10-CM

## 2023-08-21 DIAGNOSIS — Z01.419 WELL WOMAN EXAM WITH ROUTINE GYNECOLOGICAL EXAM: Primary | ICD-10-CM

## 2023-08-21 PROCEDURE — 99386 PREV VISIT NEW AGE 40-64: CPT | Performed by: OBSTETRICS & GYNECOLOGY

## 2023-08-21 PROCEDURE — 3079F DIAST BP 80-89 MM HG: CPT | Performed by: OBSTETRICS & GYNECOLOGY

## 2023-08-21 PROCEDURE — 3075F SYST BP GE 130 - 139MM HG: CPT | Performed by: OBSTETRICS & GYNECOLOGY

## 2023-08-21 RX ORDER — CLOTRIMAZOLE AND BETAMETHASONE DIPROPIONATE 10; .64 MG/G; MG/G
CREAM TOPICAL
Qty: 45 G | Refills: 3 | Status: SHIPPED | OUTPATIENT
Start: 2023-08-21

## 2023-08-21 NOTE — PROGRESS NOTES
Subjective:      Patient ID: Joana Berrios is a 48 y.o. female. HPI  pts here for annual gyn exam.  No recent pap, mammogram, or colonoscopy. H/o pelvic irradiation last year due to bladder cancer. Completed menopause prior to starting radiation tx. Noted several days of vag spotting. G0. Not sexually active. Notes occas vulvar irritation when voiding. US showed endometrial stripe 3 mm. Review of Systems Pertinent review of systems items discussed above. All others systems items not discussed above were negative. Objective:   Physical Exam  Constitutional:       Appearance: She is well-developed. HENT:      Head: Normocephalic and atraumatic. Neck:      Thyroid: No thyromegaly. Trachea: No tracheal deviation. Cardiovascular:      Rate and Rhythm: Normal rate and regular rhythm. Heart sounds: Normal heart sounds. No murmur heard. Pulmonary:      Effort: Pulmonary effort is normal. No respiratory distress. Breath sounds: Normal breath sounds. No wheezing or rales. Chest:   Breasts:     Right: No mass, nipple discharge or skin change. Left: No mass, nipple discharge or skin change. Abdominal:      General: There is no distension. Palpations: Abdomen is soft. There is no mass. Tenderness: There is no abdominal tenderness. There is no rebound. Genitourinary:     Labia:         Right: No lesion. Left: No lesion. Vagina: Normal. No foreign body. No vaginal discharge. Uterus: Not deviated, not enlarged, not fixed and not tender. Adnexa:         Right: No mass or tenderness. Left: No mass or tenderness. Rectum: Normal. Guaiac result negative. No mass or external hemorrhoid. Comments: Pap performed. Cx not visualized due to pelvic  scarring. Pap taken of vag wall. Cx/uterus fused due to radiation tx. Musculoskeletal:         General: Normal range of motion. Lymphadenopathy:      Cervical: No cervical adenopathy.

## 2023-09-07 ENCOUNTER — TELEPHONE (OUTPATIENT)
Dept: FAMILY MEDICINE CLINIC | Age: 53
End: 2023-09-07

## 2023-09-08 DIAGNOSIS — N95.0 POST-MENOPAUSAL BLEEDING: Primary | ICD-10-CM

## 2023-09-12 RX ORDER — TIRZEPATIDE 10 MG/.5ML
10 INJECTION, SOLUTION SUBCUTANEOUS WEEKLY
Qty: 4 ADJUSTABLE DOSE PRE-FILLED PEN SYRINGE | Refills: 3 | Status: SHIPPED | OUTPATIENT
Start: 2023-09-12

## 2023-09-12 NOTE — TELEPHONE ENCOUNTER
Patient called in requesting her MOUNJARO BUT SHE WOULD LIKE THE 7.5MG  instead of the 10MG    She was never able to fill the 10MG because the pharmacy did not have it in stock    She was having constipation issues so she WANTS TO STICK TO THE 7.5 AND THE [address] STOCK    Please Advise

## 2023-10-09 RX ORDER — ROSUVASTATIN CALCIUM 20 MG/1
20 TABLET, COATED ORAL DAILY
Qty: 90 TABLET | Refills: 1 | Status: ON HOLD | OUTPATIENT
Start: 2023-10-09

## 2023-10-13 ENCOUNTER — APPOINTMENT (OUTPATIENT)
Dept: CT IMAGING | Age: 53
DRG: 872 | End: 2023-10-13
Payer: COMMERCIAL

## 2023-10-13 ENCOUNTER — HOSPITAL ENCOUNTER (INPATIENT)
Age: 53
LOS: 4 days | Discharge: HOME | DRG: 872 | End: 2023-10-17
Attending: EMERGENCY MEDICINE | Admitting: HOSPITALIST
Payer: COMMERCIAL

## 2023-10-13 DIAGNOSIS — N10 ACUTE PYELONEPHRITIS: ICD-10-CM

## 2023-10-13 DIAGNOSIS — R09.02 HYPOXIA: Primary | ICD-10-CM

## 2023-10-13 PROBLEM — J96.01 ACUTE HYPOXIC RESPIRATORY FAILURE (HCC): Status: ACTIVE | Noted: 2023-10-13

## 2023-10-13 LAB
ALBUMIN SERPL-MCNC: 3.3 G/DL (ref 3.4–5)
ALBUMIN/GLOB SERPL: 0.9 {RATIO} (ref 1.1–2.2)
ALP SERPL-CCNC: 78 U/L (ref 40–129)
ALT SERPL-CCNC: 7 U/L (ref 10–40)
ANION GAP SERPL CALCULATED.3IONS-SCNC: 14 MMOL/L (ref 3–16)
AST SERPL-CCNC: 7 U/L (ref 15–37)
BACTERIA URNS QL MICRO: ABNORMAL /HPF
BASOPHILS # BLD: 0 K/UL (ref 0–0.2)
BASOPHILS NFR BLD: 0.8 %
BILIRUB SERPL-MCNC: 0.6 MG/DL (ref 0–1)
BILIRUB UR QL STRIP.AUTO: NEGATIVE
BUN SERPL-MCNC: 23 MG/DL (ref 7–20)
CALCIUM SERPL-MCNC: 8.3 MG/DL (ref 8.3–10.6)
CHARACTER UR: ABNORMAL
CHLORIDE SERPL-SCNC: 97 MMOL/L (ref 99–110)
CLARITY UR: ABNORMAL
CO2 SERPL-SCNC: 21 MMOL/L (ref 21–32)
COLOR UR: ABNORMAL
CREAT SERPL-MCNC: 1.3 MG/DL (ref 0.6–1.1)
DEPRECATED RDW RBC AUTO: 20.6 % (ref 12.4–15.4)
EOSINOPHIL # BLD: 0 K/UL (ref 0–0.6)
EOSINOPHIL NFR BLD: 0 %
EPI CELLS #/AREA URNS AUTO: 3 /HPF (ref 0–5)
EST. AVERAGE GLUCOSE BLD GHB EST-MCNC: 151.3 MG/DL
GFR SERPLBLD CREATININE-BSD FMLA CKD-EPI: 50 ML/MIN/{1.73_M2}
GLUCOSE BLD-MCNC: 215 MG/DL (ref 70–99)
GLUCOSE BLD-MCNC: 262 MG/DL (ref 70–99)
GLUCOSE BLD-MCNC: 270 MG/DL (ref 70–99)
GLUCOSE BLD-MCNC: 285 MG/DL (ref 70–99)
GLUCOSE BLD-MCNC: 353 MG/DL (ref 70–99)
GLUCOSE SERPL-MCNC: 337 MG/DL (ref 70–99)
GLUCOSE UR STRIP.AUTO-MCNC: >=1000 MG/DL
HBA1C MFR BLD: 6.9 %
HCT VFR BLD AUTO: 29.3 % (ref 36–48)
HGB BLD-MCNC: 9.9 G/DL (ref 12–16)
HGB UR QL STRIP.AUTO: ABNORMAL
HYALINE CASTS #/AREA URNS AUTO: 8 /LPF (ref 0–8)
KETONES UR STRIP.AUTO-MCNC: 80 MG/DL
LEUKOCYTE ESTERASE UR QL STRIP.AUTO: ABNORMAL
LIPASE SERPL-CCNC: 17 U/L (ref 13–60)
LYMPHOCYTES # BLD: 0.3 K/UL (ref 1–5.1)
LYMPHOCYTES NFR BLD: 9.1 %
MCH RBC QN AUTO: 28.3 PG (ref 26–34)
MCHC RBC AUTO-ENTMCNC: 33.9 G/DL (ref 31–36)
MCV RBC AUTO: 83.6 FL (ref 80–100)
MONOCYTES # BLD: 0.9 K/UL (ref 0–1.3)
MONOCYTES NFR BLD: 29.5 %
MUCUS: PRESENT
NEUTROPHILS # BLD: 1.8 K/UL (ref 1.7–7.7)
NEUTROPHILS NFR BLD: 60.6 %
NITRITE UR QL STRIP.AUTO: POSITIVE
NT-PROBNP SERPL-MCNC: 942 PG/ML (ref 0–124)
PERFORMED ON: ABNORMAL
PH UR STRIP.AUTO: 6 [PH] (ref 5–8)
PLATELET # BLD AUTO: 252 K/UL (ref 135–450)
PMV BLD AUTO: 7.6 FL (ref 5–10.5)
POTASSIUM SERPL-SCNC: 3.9 MMOL/L (ref 3.5–5.1)
PROCALCITONIN SERPL IA-MCNC: 0.34 NG/ML (ref 0–0.15)
PROT SERPL-MCNC: 6.8 G/DL (ref 6.4–8.2)
PROT UR STRIP.AUTO-MCNC: 300 MG/DL
RBC # BLD AUTO: 3.5 M/UL (ref 4–5.2)
RBC CLUMPS #/AREA URNS AUTO: 1646 /HPF (ref 0–4)
SODIUM SERPL-SCNC: 132 MMOL/L (ref 136–145)
SP GR UR STRIP.AUTO: 1.03 (ref 1–1.03)
UA COMPLETE W REFLEX CULTURE PNL UR: YES
UA DIPSTICK W REFLEX MICRO PNL UR: YES
URN SPEC COLLECT METH UR: ABNORMAL
UROBILINOGEN UR STRIP-ACNC: 1 E.U./DL
WBC # BLD AUTO: 3 K/UL (ref 4–11)
WBC #/AREA URNS AUTO: 603 /HPF (ref 0–5)

## 2023-10-13 PROCEDURE — 74176 CT ABD & PELVIS W/O CONTRAST: CPT

## 2023-10-13 PROCEDURE — 6360000004 HC RX CONTRAST MEDICATION: Performed by: EMERGENCY MEDICINE

## 2023-10-13 PROCEDURE — 87186 SC STD MICRODIL/AGAR DIL: CPT

## 2023-10-13 PROCEDURE — 81001 URINALYSIS AUTO W/SCOPE: CPT

## 2023-10-13 PROCEDURE — 83880 ASSAY OF NATRIURETIC PEPTIDE: CPT

## 2023-10-13 PROCEDURE — 6360000002 HC RX W HCPCS: Performed by: HOSPITALIST

## 2023-10-13 PROCEDURE — 6370000000 HC RX 637 (ALT 250 FOR IP): Performed by: HOSPITALIST

## 2023-10-13 PROCEDURE — 6360000002 HC RX W HCPCS: Performed by: EMERGENCY MEDICINE

## 2023-10-13 PROCEDURE — 2580000003 HC RX 258: Performed by: EMERGENCY MEDICINE

## 2023-10-13 PROCEDURE — 2700000000 HC OXYGEN THERAPY PER DAY

## 2023-10-13 PROCEDURE — 94760 N-INVAS EAR/PLS OXIMETRY 1: CPT

## 2023-10-13 PROCEDURE — 6370000000 HC RX 637 (ALT 250 FOR IP): Performed by: EMERGENCY MEDICINE

## 2023-10-13 PROCEDURE — 96375 TX/PRO/DX INJ NEW DRUG ADDON: CPT

## 2023-10-13 PROCEDURE — 83036 HEMOGLOBIN GLYCOSYLATED A1C: CPT

## 2023-10-13 PROCEDURE — 96365 THER/PROPH/DIAG IV INF INIT: CPT

## 2023-10-13 PROCEDURE — 1200000000 HC SEMI PRIVATE

## 2023-10-13 PROCEDURE — 84145 PROCALCITONIN (PCT): CPT

## 2023-10-13 PROCEDURE — 71260 CT THORAX DX C+: CPT

## 2023-10-13 PROCEDURE — 2580000003 HC RX 258: Performed by: HOSPITALIST

## 2023-10-13 PROCEDURE — 83690 ASSAY OF LIPASE: CPT

## 2023-10-13 PROCEDURE — 85025 COMPLETE CBC W/AUTO DIFF WBC: CPT

## 2023-10-13 PROCEDURE — 99285 EMERGENCY DEPT VISIT HI MDM: CPT

## 2023-10-13 PROCEDURE — 87086 URINE CULTURE/COLONY COUNT: CPT

## 2023-10-13 PROCEDURE — 87077 CULTURE AEROBIC IDENTIFY: CPT

## 2023-10-13 PROCEDURE — 36415 COLL VENOUS BLD VENIPUNCTURE: CPT

## 2023-10-13 PROCEDURE — 80053 COMPREHEN METABOLIC PANEL: CPT

## 2023-10-13 PROCEDURE — 96361 HYDRATE IV INFUSION ADD-ON: CPT

## 2023-10-13 RX ORDER — MORPHINE SULFATE 4 MG/ML
4 INJECTION, SOLUTION INTRAMUSCULAR; INTRAVENOUS ONCE
Status: COMPLETED | OUTPATIENT
Start: 2023-10-13 | End: 2023-10-13

## 2023-10-13 RX ORDER — ACETAMINOPHEN 325 MG/1
650 TABLET ORAL EVERY 6 HOURS PRN
Status: DISCONTINUED | OUTPATIENT
Start: 2023-10-13 | End: 2023-10-17 | Stop reason: HOSPADM

## 2023-10-13 RX ORDER — ONDANSETRON 2 MG/ML
4 INJECTION INTRAMUSCULAR; INTRAVENOUS ONCE
Status: COMPLETED | OUTPATIENT
Start: 2023-10-13 | End: 2023-10-13

## 2023-10-13 RX ORDER — ONDANSETRON 2 MG/ML
4 INJECTION INTRAMUSCULAR; INTRAVENOUS EVERY 6 HOURS PRN
Status: DISCONTINUED | OUTPATIENT
Start: 2023-10-13 | End: 2023-10-17 | Stop reason: HOSPADM

## 2023-10-13 RX ORDER — 0.9 % SODIUM CHLORIDE 0.9 %
1000 INTRAVENOUS SOLUTION INTRAVENOUS ONCE
Status: COMPLETED | OUTPATIENT
Start: 2023-10-13 | End: 2023-10-13

## 2023-10-13 RX ORDER — INSULIN GLARGINE 100 [IU]/ML
10 INJECTION, SOLUTION SUBCUTANEOUS DAILY
Status: DISCONTINUED | OUTPATIENT
Start: 2023-10-13 | End: 2023-10-14

## 2023-10-13 RX ORDER — INSULIN LISPRO 100 [IU]/ML
0-4 INJECTION, SOLUTION INTRAVENOUS; SUBCUTANEOUS NIGHTLY
Status: DISCONTINUED | OUTPATIENT
Start: 2023-10-13 | End: 2023-10-14

## 2023-10-13 RX ORDER — INSULIN LISPRO 100 [IU]/ML
0-4 INJECTION, SOLUTION INTRAVENOUS; SUBCUTANEOUS
Status: DISCONTINUED | OUTPATIENT
Start: 2023-10-13 | End: 2023-10-14

## 2023-10-13 RX ORDER — DEXTROSE MONOHYDRATE 100 MG/ML
INJECTION, SOLUTION INTRAVENOUS CONTINUOUS PRN
Status: DISCONTINUED | OUTPATIENT
Start: 2023-10-13 | End: 2023-10-17 | Stop reason: HOSPADM

## 2023-10-13 RX ORDER — MORPHINE SULFATE 2 MG/ML
2 INJECTION, SOLUTION INTRAMUSCULAR; INTRAVENOUS
Status: DISCONTINUED | OUTPATIENT
Start: 2023-10-13 | End: 2023-10-15

## 2023-10-13 RX ORDER — M-VIT,TX,IRON,MINS/CALC/FOLIC 27MG-0.4MG
1 TABLET ORAL DAILY
COMMUNITY

## 2023-10-13 RX ADMIN — SODIUM CHLORIDE 1000 ML: 9 INJECTION, SOLUTION INTRAVENOUS at 04:33

## 2023-10-13 RX ADMIN — ONDANSETRON 4 MG: 2 INJECTION INTRAMUSCULAR; INTRAVENOUS at 14:21

## 2023-10-13 RX ADMIN — MORPHINE SULFATE 2 MG: 2 INJECTION, SOLUTION INTRAMUSCULAR; INTRAVENOUS at 14:21

## 2023-10-13 RX ADMIN — CEFTRIAXONE 1000 MG: 1 INJECTION, POWDER, FOR SOLUTION INTRAMUSCULAR; INTRAVENOUS at 04:36

## 2023-10-13 RX ADMIN — INSULIN LISPRO 2 UNITS: 100 INJECTION, SOLUTION INTRAVENOUS; SUBCUTANEOUS at 17:33

## 2023-10-13 RX ADMIN — ONDANSETRON 4 MG: 2 INJECTION INTRAMUSCULAR; INTRAVENOUS at 23:18

## 2023-10-13 RX ADMIN — INSULIN GLARGINE 10 UNITS: 100 INJECTION, SOLUTION SUBCUTANEOUS at 14:21

## 2023-10-13 RX ADMIN — ONDANSETRON 4 MG: 2 INJECTION INTRAMUSCULAR; INTRAVENOUS at 04:15

## 2023-10-13 RX ADMIN — ACETAMINOPHEN 650 MG: 325 TABLET ORAL at 23:24

## 2023-10-13 RX ADMIN — IOPAMIDOL 75 ML: 755 INJECTION, SOLUTION INTRAVENOUS at 07:14

## 2023-10-13 RX ADMIN — CEFTRIAXONE 1000 MG: 1 INJECTION, POWDER, FOR SOLUTION INTRAMUSCULAR; INTRAVENOUS at 13:39

## 2023-10-13 RX ADMIN — MORPHINE SULFATE 4 MG: 4 INJECTION, SOLUTION INTRAMUSCULAR; INTRAVENOUS at 04:18

## 2023-10-13 RX ADMIN — MORPHINE SULFATE 2 MG: 2 INJECTION, SOLUTION INTRAMUSCULAR; INTRAVENOUS at 23:18

## 2023-10-13 RX ADMIN — MORPHINE SULFATE 2 MG: 2 INJECTION, SOLUTION INTRAMUSCULAR; INTRAVENOUS at 17:36

## 2023-10-13 RX ADMIN — ACETAMINOPHEN 650 MG: 325 TABLET ORAL at 16:25

## 2023-10-13 RX ADMIN — INSULIN HUMAN 5 UNITS: 100 INJECTION, SOLUTION PARENTERAL at 04:34

## 2023-10-13 RX ADMIN — INSULIN LISPRO 1 UNITS: 100 INJECTION, SOLUTION INTRAVENOUS; SUBCUTANEOUS at 13:40

## 2023-10-13 ASSESSMENT — PATIENT HEALTH QUESTIONNAIRE - PHQ9
1. LITTLE INTEREST OR PLEASURE IN DOING THINGS: 0
2. FEELING DOWN, DEPRESSED OR HOPELESS: 0
SUM OF ALL RESPONSES TO PHQ QUESTIONS 1-9: 0
SUM OF ALL RESPONSES TO PHQ QUESTIONS 1-9: 0
SUM OF ALL RESPONSES TO PHQ9 QUESTIONS 1 & 2: 0
SUM OF ALL RESPONSES TO PHQ QUESTIONS 1-9: 0
SUM OF ALL RESPONSES TO PHQ QUESTIONS 1-9: 0

## 2023-10-13 ASSESSMENT — LIFESTYLE VARIABLES
HOW OFTEN DO YOU HAVE A DRINK CONTAINING ALCOHOL: MONTHLY OR LESS
HOW MANY STANDARD DRINKS CONTAINING ALCOHOL DO YOU HAVE ON A TYPICAL DAY: 1 OR 2

## 2023-10-13 ASSESSMENT — PAIN SCALES - GENERAL
PAINLEVEL_OUTOF10: 7
PAINLEVEL_OUTOF10: 5
PAINLEVEL_OUTOF10: 7
PAINLEVEL_OUTOF10: 8
PAINLEVEL_OUTOF10: 8
PAINLEVEL_OUTOF10: 7
PAINLEVEL_OUTOF10: 7

## 2023-10-13 ASSESSMENT — PAIN DESCRIPTION - ORIENTATION
ORIENTATION: LEFT

## 2023-10-13 ASSESSMENT — PAIN DESCRIPTION - LOCATION
LOCATION: FLANK

## 2023-10-13 ASSESSMENT — PAIN - FUNCTIONAL ASSESSMENT
PAIN_FUNCTIONAL_ASSESSMENT: ACTIVITIES ARE NOT PREVENTED
PAIN_FUNCTIONAL_ASSESSMENT: 0-10

## 2023-10-13 ASSESSMENT — PAIN DESCRIPTION - DESCRIPTORS: DESCRIPTORS: CRAMPING

## 2023-10-13 ASSESSMENT — PAIN DESCRIPTION - FREQUENCY: FREQUENCY: CONTINUOUS

## 2023-10-13 NOTE — PROGRESS NOTES
Pt arrived to room from ED. PT alert and oriented, UAL, complaining of 8/10 pain in her side going down to her bladder. Bp slightly elevated other VSS. Md messaged for more orders. Call light with in reach. No other needs at this time.

## 2023-10-13 NOTE — ED TRIAGE NOTES
Pt into ED from home c/o 8/10 L flank pain that radiates to mid ABD area. Pain has x3 days, was previously controlled by tylenol. Pt denies dysuria but states she does have frequent urination. Hx of bladder cancer. Pt has has chills, nausea w/o vomiting, last BM today.  A/ox4, VSS

## 2023-10-13 NOTE — PROGRESS NOTES
Medication Reconciliation    List of medications patient is currently taking is complete. Source of information: 1. Conversation with patient at bedside                                      2. EPIC records        Notes regarding home medications:   1. Patient did not receive any medications prior to arrival to the emergency department.       Hermes Turner [facility], PharmD, 10/13/2023 10:32 AM

## 2023-10-13 NOTE — ED PROVIDER NOTES
Emergency Department Provider Note  Location: [address] [place] [address]  10/13/2023     Patient Identification  Trent Escalera is a 48 y.o. female    Chief Complaint  Flank Pain (Pt into ED from home c/o 8/10 L flank pain that radiates to mid ABD area. Pain has x3 days, was previously controlled by tylenol. Pt denies dysuria but states she does have frequent urination. Hx of bladder cancer.)    HPI  (History provided by patient)  This is a 48 y.o. female with a PMH significant for DM  presented today for left flank pain. Patient presented with intermittent left flank pain that for started 3 days ago but acutely worse tonight. She described her flank pain as 8/10 in intensity. It radiates down to the left lower quadrant. Pain does not radiate down her leg. No bowel or urinary retention or incontinence. No saddle paresthesia. She denies dysuria. Patient states she had a cystoscopy in August and ever since she has urinary frequency. She has not noticed any worsening urinary frequency with flank pain. No fever. She has nausea associated with the pain. She vomited once prior to arrival.  She states due to history of bladder cancer, she always has some slight hematuria. She denies prior history of kidney stone. No other complaints, modifying factors or associated symptoms. I have reviewed the following nursing documentation:  Allergies: Allergies   Allergen Reactions    Tetracyclines & Related        Past medical history:  has a past medical history of Allergic rhinitis, Elevated blood pressure, and Uncontrolled diabetes mellitus type 2 without complications (1/29/2856). Past surgical history:  has a past surgical history that includes Mandible surgery (1992). Home medications:   Prior to Admission medications    Medication Sig Start Date End Date Taking?  Authorizing Provider   rosuvastatin (CRESTOR) 20 MG tablet TAKE 1 TABLET BY MOUTH DAILY 10/9/23   Tammy Alcala

## 2023-10-13 NOTE — ED NOTES
Pt to CT at this time, transported on [facility] by imaging staff.         Nicole Pope RN  10/13/23 0029

## 2023-10-13 NOTE — ED NOTES
Patient ambulated to bathroom and back to room independently. Patient SPO2 94-95% while ambulating. Patient returned to bed, a little while later patient SPO2 dropped down to 71% on RA. Patient placed back on 3L NC and returned to mid 90%. Patient reports pain 5-6/10. Dr. Rossy Turner made aware.       Omid Salguero RN  10/13/23 2531

## 2023-10-13 NOTE — ED NOTES
Patient had 2 episodes of hypoxia with SPO2 dropping as low as 84% on RA. Patient reports that she's never been tested for sleep apnea. Patient resting comfortably in bed when RN enters room, wakes to noise or verbal stimulation. Placed on 3 L NC.       Delroy Nobles RN  10/13/23 0449

## 2023-10-14 LAB
ANION GAP SERPL CALCULATED.3IONS-SCNC: 7 MMOL/L (ref 3–16)
BASOPHILS # BLD: 0 K/UL (ref 0–0.2)
BASOPHILS NFR BLD: 0 %
BUN SERPL-MCNC: 23 MG/DL (ref 7–20)
CALCIUM SERPL-MCNC: 8.2 MG/DL (ref 8.3–10.6)
CHLORIDE SERPL-SCNC: 101 MMOL/L (ref 99–110)
CO2 SERPL-SCNC: 28 MMOL/L (ref 21–32)
CREAT SERPL-MCNC: 1.1 MG/DL (ref 0.6–1.1)
DEPRECATED RDW RBC AUTO: 20.5 % (ref 12.4–15.4)
EOSINOPHIL # BLD: 0 K/UL (ref 0–0.6)
EOSINOPHIL NFR BLD: 0 %
FOLATE SERPL-MCNC: 10.13 NG/ML (ref 4.78–24.2)
GFR SERPLBLD CREATININE-BSD FMLA CKD-EPI: >60 ML/MIN/{1.73_M2}
GLUCOSE BLD-MCNC: 218 MG/DL (ref 70–99)
GLUCOSE BLD-MCNC: 273 MG/DL (ref 70–99)
GLUCOSE BLD-MCNC: 289 MG/DL (ref 70–99)
GLUCOSE BLD-MCNC: 332 MG/DL (ref 70–99)
GLUCOSE SERPL-MCNC: 213 MG/DL (ref 70–99)
HCT VFR BLD AUTO: 25.9 % (ref 36–48)
HGB BLD-MCNC: 8.6 G/DL (ref 12–16)
IRON SATN MFR SERPL: 10 % (ref 15–50)
IRON SERPL-MCNC: 16 UG/DL (ref 37–145)
LYMPHOCYTES # BLD: 0.4 K/UL (ref 1–5.1)
LYMPHOCYTES NFR BLD: 16 %
MCH RBC QN AUTO: 28.1 PG (ref 26–34)
MCHC RBC AUTO-ENTMCNC: 33.4 G/DL (ref 31–36)
MCV RBC AUTO: 84.2 FL (ref 80–100)
MONOCYTES # BLD: 0.2 K/UL (ref 0–1.3)
MONOCYTES NFR BLD: 8 %
NEUTROPHILS # BLD: 1.7 K/UL (ref 1.7–7.7)
NEUTROPHILS NFR BLD: 67 %
NEUTS BAND NFR BLD MANUAL: 9 % (ref 0–7)
PERFORMED ON: ABNORMAL
PLATELET # BLD AUTO: 211 K/UL (ref 135–450)
PLATELET BLD QL SMEAR: ADEQUATE
PMV BLD AUTO: 7.8 FL (ref 5–10.5)
POTASSIUM SERPL-SCNC: 4.1 MMOL/L (ref 3.5–5.1)
RBC # BLD AUTO: 3.08 M/UL (ref 4–5.2)
SLIDE REVIEW: ABNORMAL
SODIUM SERPL-SCNC: 136 MMOL/L (ref 136–145)
TIBC SERPL-MCNC: 164 UG/DL (ref 260–445)
VIT B12 SERPL-MCNC: 1599 PG/ML (ref 211–911)
WBC # BLD AUTO: 2.3 K/UL (ref 4–11)

## 2023-10-14 PROCEDURE — 85025 COMPLETE CBC W/AUTO DIFF WBC: CPT

## 2023-10-14 PROCEDURE — 6360000002 HC RX W HCPCS: Performed by: HOSPITALIST

## 2023-10-14 PROCEDURE — 83550 IRON BINDING TEST: CPT

## 2023-10-14 PROCEDURE — 82607 VITAMIN B-12: CPT

## 2023-10-14 PROCEDURE — 83540 ASSAY OF IRON: CPT

## 2023-10-14 PROCEDURE — 51798 US URINE CAPACITY MEASURE: CPT

## 2023-10-14 PROCEDURE — 6370000000 HC RX 637 (ALT 250 FOR IP): Performed by: HOSPITALIST

## 2023-10-14 PROCEDURE — 36415 COLL VENOUS BLD VENIPUNCTURE: CPT

## 2023-10-14 PROCEDURE — 80048 BASIC METABOLIC PNL TOTAL CA: CPT

## 2023-10-14 PROCEDURE — 82746 ASSAY OF FOLIC ACID SERUM: CPT

## 2023-10-14 PROCEDURE — 6370000000 HC RX 637 (ALT 250 FOR IP): Performed by: NURSE PRACTITIONER

## 2023-10-14 PROCEDURE — 1200000000 HC SEMI PRIVATE

## 2023-10-14 PROCEDURE — 2580000003 HC RX 258: Performed by: HOSPITALIST

## 2023-10-14 RX ORDER — CETIRIZINE HYDROCHLORIDE 10 MG/1
10 TABLET ORAL DAILY
Status: DISCONTINUED | OUTPATIENT
Start: 2023-10-14 | End: 2023-10-17 | Stop reason: HOSPADM

## 2023-10-14 RX ORDER — POLYETHYLENE GLYCOL 3350 17 G/17G
17 POWDER, FOR SOLUTION ORAL DAILY
Status: DISCONTINUED | OUTPATIENT
Start: 2023-10-14 | End: 2023-10-17 | Stop reason: HOSPADM

## 2023-10-14 RX ORDER — FLUTICASONE PROPIONATE 50 MCG
1 SPRAY, SUSPENSION (ML) NASAL DAILY
Status: DISCONTINUED | OUTPATIENT
Start: 2023-10-14 | End: 2023-10-17 | Stop reason: HOSPADM

## 2023-10-14 RX ORDER — DOCUSATE SODIUM 100 MG/1
200 CAPSULE, LIQUID FILLED ORAL 2 TIMES DAILY
Status: DISCONTINUED | OUTPATIENT
Start: 2023-10-14 | End: 2023-10-17 | Stop reason: HOSPADM

## 2023-10-14 RX ORDER — INSULIN LISPRO 100 [IU]/ML
0-4 INJECTION, SOLUTION INTRAVENOUS; SUBCUTANEOUS NIGHTLY
Status: DISCONTINUED | OUTPATIENT
Start: 2023-10-14 | End: 2023-10-17 | Stop reason: HOSPADM

## 2023-10-14 RX ORDER — INSULIN LISPRO 100 [IU]/ML
0-16 INJECTION, SOLUTION INTRAVENOUS; SUBCUTANEOUS
Status: DISCONTINUED | OUTPATIENT
Start: 2023-10-14 | End: 2023-10-17 | Stop reason: HOSPADM

## 2023-10-14 RX ORDER — METOPROLOL SUCCINATE 50 MG/1
50 TABLET, EXTENDED RELEASE ORAL DAILY
Status: DISCONTINUED | OUTPATIENT
Start: 2023-10-14 | End: 2023-10-17 | Stop reason: HOSPADM

## 2023-10-14 RX ORDER — INSULIN GLARGINE 100 [IU]/ML
15 INJECTION, SOLUTION SUBCUTANEOUS DAILY
Status: DISCONTINUED | OUTPATIENT
Start: 2023-10-15 | End: 2023-10-17 | Stop reason: HOSPADM

## 2023-10-14 RX ORDER — ROSUVASTATIN CALCIUM 20 MG/1
20 TABLET, COATED ORAL NIGHTLY
Status: DISCONTINUED | OUTPATIENT
Start: 2023-10-14 | End: 2023-10-17 | Stop reason: HOSPADM

## 2023-10-14 RX ADMIN — MORPHINE SULFATE 2 MG: 2 INJECTION, SOLUTION INTRAMUSCULAR; INTRAVENOUS at 22:45

## 2023-10-14 RX ADMIN — CEFTRIAXONE SODIUM 2000 MG: 2 INJECTION, POWDER, FOR SOLUTION INTRAMUSCULAR; INTRAVENOUS at 08:47

## 2023-10-14 RX ADMIN — SERTRALINE 50 MG: 50 TABLET, FILM COATED ORAL at 13:07

## 2023-10-14 RX ADMIN — CETIRIZINE HYDROCHLORIDE 10 MG: 10 TABLET ORAL at 13:07

## 2023-10-14 RX ADMIN — INSULIN LISPRO 8 UNITS: 100 INJECTION, SOLUTION INTRAVENOUS; SUBCUTANEOUS at 17:55

## 2023-10-14 RX ADMIN — METOPROLOL SUCCINATE 50 MG: 50 TABLET, EXTENDED RELEASE ORAL at 13:07

## 2023-10-14 RX ADMIN — INSULIN LISPRO 2 UNITS: 100 INJECTION, SOLUTION INTRAVENOUS; SUBCUTANEOUS at 08:48

## 2023-10-14 RX ADMIN — ACETAMINOPHEN 650 MG: 325 TABLET ORAL at 22:44

## 2023-10-14 RX ADMIN — MORPHINE SULFATE 2 MG: 2 INJECTION, SOLUTION INTRAMUSCULAR; INTRAVENOUS at 11:10

## 2023-10-14 RX ADMIN — FLUTICASONE PROPIONATE 1 SPRAY: 50 SPRAY, METERED NASAL at 13:07

## 2023-10-14 RX ADMIN — INSULIN LISPRO 4 UNITS: 100 INJECTION, SOLUTION INTRAVENOUS; SUBCUTANEOUS at 19:59

## 2023-10-14 RX ADMIN — MORPHINE SULFATE 2 MG: 2 INJECTION, SOLUTION INTRAMUSCULAR; INTRAVENOUS at 18:43

## 2023-10-14 RX ADMIN — ONDANSETRON 4 MG: 2 INJECTION INTRAMUSCULAR; INTRAVENOUS at 06:03

## 2023-10-14 RX ADMIN — ACETAMINOPHEN 650 MG: 325 TABLET ORAL at 06:03

## 2023-10-14 RX ADMIN — INSULIN GLARGINE 10 UNITS: 100 INJECTION, SOLUTION SUBCUTANEOUS at 08:47

## 2023-10-14 RX ADMIN — MORPHINE SULFATE 2 MG: 2 INJECTION, SOLUTION INTRAMUSCULAR; INTRAVENOUS at 06:03

## 2023-10-14 RX ADMIN — DOCUSATE SODIUM 200 MG: 100 CAPSULE, LIQUID FILLED ORAL at 13:07

## 2023-10-14 RX ADMIN — ACETAMINOPHEN 650 MG: 325 TABLET ORAL at 13:07

## 2023-10-14 RX ADMIN — ROSUVASTATIN CALCIUM 20 MG: 20 TABLET, FILM COATED ORAL at 19:59

## 2023-10-14 RX ADMIN — DOCUSATE SODIUM 200 MG: 100 CAPSULE, LIQUID FILLED ORAL at 19:59

## 2023-10-14 RX ADMIN — INSULIN LISPRO 4 UNITS: 100 INJECTION, SOLUTION INTRAVENOUS; SUBCUTANEOUS at 13:07

## 2023-10-14 RX ADMIN — POLYETHYLENE GLYCOL 3350 17 G: 17 POWDER, FOR SOLUTION ORAL at 13:07

## 2023-10-14 RX ADMIN — MORPHINE SULFATE 2 MG: 2 INJECTION, SOLUTION INTRAMUSCULAR; INTRAVENOUS at 15:32

## 2023-10-14 ASSESSMENT — PAIN SCALES - GENERAL
PAINLEVEL_OUTOF10: 6
PAINLEVEL_OUTOF10: 7
PAINLEVEL_OUTOF10: 8
PAINLEVEL_OUTOF10: 6
PAINLEVEL_OUTOF10: 7

## 2023-10-14 ASSESSMENT — PAIN DESCRIPTION - DESCRIPTORS
DESCRIPTORS: ACHING;SHARP
DESCRIPTORS: SHARP
DESCRIPTORS: SHARP

## 2023-10-14 ASSESSMENT — PAIN - FUNCTIONAL ASSESSMENT
PAIN_FUNCTIONAL_ASSESSMENT: ACTIVITIES ARE NOT PREVENTED

## 2023-10-14 ASSESSMENT — PAIN DESCRIPTION - ORIENTATION
ORIENTATION: LEFT

## 2023-10-14 ASSESSMENT — PAIN DESCRIPTION - LOCATION
LOCATION: ABDOMEN;FLANK
LOCATION: FLANK
LOCATION: HEAD;FLANK
LOCATION: ABDOMEN;FLANK
LOCATION: ABDOMEN;FLANK

## 2023-10-14 NOTE — PROGRESS NOTES
4 Eyes Skin Assessment     NAME:  Maco Hodges  YOB: 1970  MEDICAL RECORD NUMBER:  [id number]    The patient is being assessed for  Admission    I agree that at least one RN has performed a thorough Head to Toe Skin Assessment on the patient. ALL assessment sites listed below have been assessed. Areas assessed by both nurses:    Head, Face, Ears, Shoulders, Back, Chest, Arms, Elbows, Hands, Sacrum. Buttock, Coccyx, Ischium, Legs. Feet and Heels, and Under Medical Devices         Does the Patient have a Wound?  No noted wound(s)       Felix Prevention initiated by RN: No  Wound Care Orders initiated by RN: No    Pressure Injury (Stage 3,4, Unstageable, DTI, NWPT, and Complex wounds) if present, place Wound referral order by RN under : No    New Ostomies, if present place, Ostomy referral order under : No     Nurse 1 eSignature: Electronically signed by Kris Isaac RN on 10/14/23 at 5:49 AM EDT    **SHARE this note so that the co-signing nurse can place an eSignature**    Nurse 2 eSignature: Electronically signed by Zach Newell RN on 10/14/23 at 5:50 AM EDT

## 2023-10-14 NOTE — PLAN OF CARE
Problem: Discharge Planning  Goal: Discharge to home or other facility with appropriate resources  10/14/2023 0547 by Catrachita Garsia RN  Outcome: Progressing  10/13/2023 1632 by Hai Engel RN  Outcome: Progressing     Problem: Pain  Goal: Verbalizes/displays adequate comfort level or baseline comfort level  10/14/2023 0547 by Catrachita Garsia RN  Outcome: Progressing  10/13/2023 1632 by Hai Engel RN  Outcome: Progressing     Problem: Safety - Adult  Goal: Free from fall injury  10/14/2023 0547 by Catrachita Garsia RN  Outcome: Progressing  10/13/2023 1632 by Hai Engel RN  Outcome: Progressing

## 2023-10-14 NOTE — PROGRESS NOTES
02:26 AM    BLOODU LARGE 10/13/2023 02:26 AM    SPECGRAV 1.026 10/13/2023 02:26 AM    GLUCOSEU >=1000 10/13/2023 02:26 AM       Radiology:  CT CHEST PULMONARY EMBOLISM W CONTRAST   Final Result   No evidence of pulmonary embolism or acute pulmonary abnormality. CT ABDOMEN PELVIS WO CONTRAST Additional Contrast? None   Final Result   The left kidney is edematous with perinephric stranding more than the right   side. There is mild distension of the left renal calices and proximal ureter   but without a definite ureteral calculus. Correlate clinically for recent   passage of a stone versus ascending urinary tract infection and   pyelonephritis. The urinary bladder is under distended but the wall is thickened with   perivesicular edema indicating prominent cystitis. .      Constipation in the right hemicolon but there is no obstruction, ascites, or   pneumoperitoneum. Multiple small nodules within the right and left adrenal gland are unchanged   from 07/13/2022. Most likely representing benign adenomas and no dedicated   follow-up is recommended. DVT Prophylaxis: SCDs  Diet: ADULT DIET; Regular; 3 carb choices (45 gm/meal)  Code Status: Full Code    PT/OT Eval Status: No acute needs    Dispo -Home once medically stable, likely 2 to 3 days    Jacy Dewey, APRN - CNP      NOTE:  This report was transcribed using voice recognition software. Every effort was made to ensure accuracy; however, inadvertent computerized transcription errors may be present.

## 2023-10-14 NOTE — PLAN OF CARE
Problem: Discharge Planning  Goal: Discharge to home or other facility with appropriate resources  10/14/2023 1929 by Derrick Raphael RN  Outcome: Progressing  10/14/2023 0547 by Catrachita Garsia RN  Outcome: Progressing     Problem: Pain  Goal: Verbalizes/displays adequate comfort level or baseline comfort level  10/14/2023 1929 by Derrick Raphael RN  Outcome: Progressing  10/14/2023 0547 by Catrachita Garsia RN  Outcome: Progressing     Problem: Safety - Adult  Goal: Free from fall injury  10/14/2023 1929 by Derrick Raphael RN  Outcome: Progressing  10/14/2023 0547 by Catrachita Garsia RN  Outcome: Progressing

## 2023-10-14 NOTE — CONSULTS
Consulting: Min Jeong    Reason for Consult: hematuria    History of Present Illness: Jessica Park is a 48 y.o. female with history of HGT2 bladder ca dx in  treated with bladder sparing TURBT/ Chemo/ XRT. Last surveillance cysto with Dr Cynthia Canseco in  was negative. She has had intermittent hematuria with clots since that time. She is admitted with left flank pain and CT shows mild left hydro likely due to infection. Urine cx growing klebsiella. Past Medical History:   Past Medical History:   Diagnosis Date    Allergic rhinitis     Elevated blood pressure     Uncontrolled diabetes mellitus type 2 without complications    Bladder cancer    Past Surgical History:  Past Surgical History:   Procedure Laterality Date    MANDIBLE SURGERY      for jaw alignment       Social History:  Social History     Socioeconomic History    Marital status: Single     Spouse name: Not on file    Number of children: 0    Years of education: Not on file    Highest education level: Not on file   Occupational History    Occupation: clerical     Comment: [employer]   Tobacco Use    Smoking status: Former     Packs/day: 1.00     Years: 19.00     Additional pack years: 0.00     Total pack years: 19.00     Types: Cigarettes     Start date: 1988     Quit date: 2007     Years since quittin.7    Smokeless tobacco: Never   Substance and Sexual Activity    Alcohol use:  Yes     Alcohol/week: 0.0 standard drinks of alcohol     Comment: rarely    Drug use: No    Sexual activity: Not Currently   Other Topics Concern    Not on file   Social History Narrative    Not on file     Social Determinants of Health     Financial Resource Strain: Low Risk  (2023)    Overall Financial Resource Strain (CARDIA)     Difficulty of Paying Living Expenses: Not hard at all   Food Insecurity: No Food Insecurity (2023)    Hunger Vital Sign     Worried About Running Out of Food in the Last Year: Never true Imaging:Pertinent images and radiologist's report were reviewed independently  The left kidney is edematous with perinephric stranding more than the right  side. There is mild distension of the left renal calices and proximal ureter  but without a definite ureteral calculus. Correlate clinically for recent  passage of a stone versus ascending urinary tract infection and  pyelonephritis. The urinary bladder is under distended but the wall is thickened with  perivesicular edema indicating prominent cystitis. .    Impression/Plan:   UTI- complete 2 week course of culture specific abx    Hematuria- may be due to UTI but given history, needs cysto with left retrograde pyelogram as outpatient to rule out recurrent cancer    Mariah Justice MD 26/03/82140:63 PM

## 2023-10-15 LAB
ANION GAP SERPL CALCULATED.3IONS-SCNC: 7 MMOL/L (ref 3–16)
BACTERIA UR CULT: ABNORMAL
BUN SERPL-MCNC: 28 MG/DL (ref 7–20)
CALCIUM SERPL-MCNC: 8.2 MG/DL (ref 8.3–10.6)
CHLORIDE SERPL-SCNC: 102 MMOL/L (ref 99–110)
CO2 SERPL-SCNC: 28 MMOL/L (ref 21–32)
CREAT SERPL-MCNC: 1.1 MG/DL (ref 0.6–1.1)
DEPRECATED RDW RBC AUTO: 20.4 % (ref 12.4–15.4)
GFR SERPLBLD CREATININE-BSD FMLA CKD-EPI: >60 ML/MIN/{1.73_M2}
GLUCOSE BLD-MCNC: 183 MG/DL (ref 70–99)
GLUCOSE BLD-MCNC: 205 MG/DL (ref 70–99)
GLUCOSE BLD-MCNC: 252 MG/DL (ref 70–99)
GLUCOSE BLD-MCNC: 265 MG/DL (ref 70–99)
GLUCOSE SERPL-MCNC: 165 MG/DL (ref 70–99)
HCT VFR BLD AUTO: 25.9 % (ref 36–48)
HGB BLD-MCNC: 8.6 G/DL (ref 12–16)
MCH RBC QN AUTO: 28 PG (ref 26–34)
MCHC RBC AUTO-ENTMCNC: 33.1 G/DL (ref 31–36)
MCV RBC AUTO: 84.6 FL (ref 80–100)
ORGANISM: ABNORMAL
PERFORMED ON: ABNORMAL
PLATELET # BLD AUTO: 207 K/UL (ref 135–450)
PMV BLD AUTO: 8.1 FL (ref 5–10.5)
POTASSIUM SERPL-SCNC: 3.8 MMOL/L (ref 3.5–5.1)
RBC # BLD AUTO: 3.06 M/UL (ref 4–5.2)
SODIUM SERPL-SCNC: 137 MMOL/L (ref 136–145)
WBC # BLD AUTO: 2.3 K/UL (ref 4–11)

## 2023-10-15 PROCEDURE — 6360000002 HC RX W HCPCS: Performed by: HOSPITALIST

## 2023-10-15 PROCEDURE — 6370000000 HC RX 637 (ALT 250 FOR IP): Performed by: HOSPITALIST

## 2023-10-15 PROCEDURE — 85027 COMPLETE CBC AUTOMATED: CPT

## 2023-10-15 PROCEDURE — 80048 BASIC METABOLIC PNL TOTAL CA: CPT

## 2023-10-15 PROCEDURE — 6370000000 HC RX 637 (ALT 250 FOR IP): Performed by: NURSE PRACTITIONER

## 2023-10-15 PROCEDURE — 1200000000 HC SEMI PRIVATE

## 2023-10-15 PROCEDURE — 6360000002 HC RX W HCPCS: Performed by: NURSE PRACTITIONER

## 2023-10-15 PROCEDURE — 2580000003 HC RX 258: Performed by: HOSPITALIST

## 2023-10-15 PROCEDURE — 94760 N-INVAS EAR/PLS OXIMETRY 1: CPT

## 2023-10-15 RX ORDER — HYDROCODONE BITARTRATE AND ACETAMINOPHEN 5; 325 MG/1; MG/1
1 TABLET ORAL EVERY 4 HOURS PRN
Status: DISCONTINUED | OUTPATIENT
Start: 2023-10-15 | End: 2023-10-17 | Stop reason: HOSPADM

## 2023-10-15 RX ORDER — HYDROCODONE BITARTRATE AND ACETAMINOPHEN 5; 325 MG/1; MG/1
2 TABLET ORAL EVERY 4 HOURS PRN
Status: DISCONTINUED | OUTPATIENT
Start: 2023-10-15 | End: 2023-10-17 | Stop reason: HOSPADM

## 2023-10-15 RX ORDER — CIPROFLOXACIN 2 MG/ML
400 INJECTION, SOLUTION INTRAVENOUS EVERY 12 HOURS
Status: DISCONTINUED | OUTPATIENT
Start: 2023-10-15 | End: 2023-10-16

## 2023-10-15 RX ORDER — KETOROLAC TROMETHAMINE 15 MG/ML
15 INJECTION, SOLUTION INTRAMUSCULAR; INTRAVENOUS ONCE
Status: COMPLETED | OUTPATIENT
Start: 2023-10-15 | End: 2023-10-15

## 2023-10-15 RX ADMIN — KETOROLAC TROMETHAMINE 15 MG: 15 INJECTION, SOLUTION INTRAMUSCULAR; INTRAVENOUS at 13:41

## 2023-10-15 RX ADMIN — IRON SUCROSE 200 MG: 20 INJECTION, SOLUTION INTRAVENOUS at 13:42

## 2023-10-15 RX ADMIN — POLYETHYLENE GLYCOL 3350 17 G: 17 POWDER, FOR SOLUTION ORAL at 09:26

## 2023-10-15 RX ADMIN — ROSUVASTATIN CALCIUM 20 MG: 20 TABLET, FILM COATED ORAL at 20:55

## 2023-10-15 RX ADMIN — ACETAMINOPHEN 650 MG: 325 TABLET ORAL at 04:51

## 2023-10-15 RX ADMIN — DOCUSATE SODIUM 200 MG: 100 CAPSULE, LIQUID FILLED ORAL at 20:55

## 2023-10-15 RX ADMIN — INSULIN GLARGINE 15 UNITS: 100 INJECTION, SOLUTION SUBCUTANEOUS at 09:26

## 2023-10-15 RX ADMIN — METOPROLOL SUCCINATE 50 MG: 50 TABLET, EXTENDED RELEASE ORAL at 09:26

## 2023-10-15 RX ADMIN — CETIRIZINE HYDROCHLORIDE 10 MG: 10 TABLET ORAL at 09:26

## 2023-10-15 RX ADMIN — INSULIN LISPRO 8 UNITS: 100 INJECTION, SOLUTION INTRAVENOUS; SUBCUTANEOUS at 12:30

## 2023-10-15 RX ADMIN — CEFTRIAXONE SODIUM 2000 MG: 2 INJECTION, POWDER, FOR SOLUTION INTRAMUSCULAR; INTRAVENOUS at 09:25

## 2023-10-15 RX ADMIN — SERTRALINE 50 MG: 50 TABLET, FILM COATED ORAL at 09:26

## 2023-10-15 RX ADMIN — INSULIN LISPRO 4 UNITS: 100 INJECTION, SOLUTION INTRAVENOUS; SUBCUTANEOUS at 18:08

## 2023-10-15 RX ADMIN — FLUTICASONE PROPIONATE 1 SPRAY: 50 SPRAY, METERED NASAL at 09:27

## 2023-10-15 RX ADMIN — HYDROCODONE BITARTRATE AND ACETAMINOPHEN 1 TABLET: 5; 325 TABLET ORAL at 09:51

## 2023-10-15 RX ADMIN — ACETAMINOPHEN 650 MG: 325 TABLET ORAL at 20:58

## 2023-10-15 RX ADMIN — CIPROFLOXACIN 400 MG: 400 INJECTION, SOLUTION INTRAVENOUS at 14:31

## 2023-10-15 RX ADMIN — DOCUSATE SODIUM 200 MG: 100 CAPSULE, LIQUID FILLED ORAL at 09:26

## 2023-10-15 ASSESSMENT — PAIN - FUNCTIONAL ASSESSMENT
PAIN_FUNCTIONAL_ASSESSMENT: ACTIVITIES ARE NOT PREVENTED
PAIN_FUNCTIONAL_ASSESSMENT: PREVENTS OR INTERFERES SOME ACTIVE ACTIVITIES AND ADLS
PAIN_FUNCTIONAL_ASSESSMENT: ACTIVITIES ARE NOT PREVENTED

## 2023-10-15 ASSESSMENT — PAIN DESCRIPTION - LOCATION
LOCATION: HEAD
LOCATION: ABDOMEN;FLANK
LOCATION: HEAD
LOCATION: HEAD

## 2023-10-15 ASSESSMENT — PAIN SCALES - GENERAL
PAINLEVEL_OUTOF10: 4
PAINLEVEL_OUTOF10: 5
PAINLEVEL_OUTOF10: 8
PAINLEVEL_OUTOF10: 5

## 2023-10-15 ASSESSMENT — PAIN DESCRIPTION - DESCRIPTORS
DESCRIPTORS: ACHING
DESCRIPTORS: ACHING;SHARP
DESCRIPTORS: CRUSHING;NAGGING

## 2023-10-15 ASSESSMENT — PAIN DESCRIPTION - ORIENTATION
ORIENTATION: MID
ORIENTATION: LEFT
ORIENTATION: MID

## 2023-10-15 NOTE — PROGRESS NOTES
Patient on room air since 0920. Patient O2 sat >93% without exertion. Patient O2 sat 94-96% with ambulation.     Electronically signed by Gina Jones RN on 10/15/23 at 1:07 PM EDT

## 2023-10-15 NOTE — PLAN OF CARE
Problem: Discharge Planning  Goal: Discharge to home or other facility with appropriate resources  10/14/2023 2323 by Becca Rebollar RN  Outcome: Progressing  10/14/2023 1929 by Tyseha Richter RN  Outcome: Progressing     Problem: Pain  Goal: Verbalizes/displays adequate comfort level or baseline comfort level  10/14/2023 2323 by Becca Rebollar RN  Outcome: Progressing  10/14/2023 1929 by Tyesha Richter RN  Outcome: Progressing     Problem: Safety - Adult  Goal: Free from fall injury  10/14/2023 2323 by Becca Rebollar RN  Outcome: Progressing  10/14/2023 1929 by Tyesha Richter RN  Outcome: Progressing

## 2023-10-16 LAB
ANION GAP SERPL CALCULATED.3IONS-SCNC: 8 MMOL/L (ref 3–16)
BUN SERPL-MCNC: 32 MG/DL (ref 7–20)
CALCIUM SERPL-MCNC: 8 MG/DL (ref 8.3–10.6)
CHLORIDE SERPL-SCNC: 101 MMOL/L (ref 99–110)
CO2 SERPL-SCNC: 27 MMOL/L (ref 21–32)
CREAT SERPL-MCNC: 1 MG/DL (ref 0.6–1.1)
DEPRECATED RDW RBC AUTO: 20.7 % (ref 12.4–15.4)
GFR SERPLBLD CREATININE-BSD FMLA CKD-EPI: >60 ML/MIN/{1.73_M2}
GLUCOSE BLD-MCNC: 160 MG/DL (ref 70–99)
GLUCOSE BLD-MCNC: 221 MG/DL (ref 70–99)
GLUCOSE BLD-MCNC: 227 MG/DL (ref 70–99)
GLUCOSE BLD-MCNC: 282 MG/DL (ref 70–99)
GLUCOSE SERPL-MCNC: 150 MG/DL (ref 70–99)
HCT VFR BLD AUTO: 25.8 % (ref 36–48)
HGB BLD-MCNC: 8.5 G/DL (ref 12–16)
MCH RBC QN AUTO: 27.9 PG (ref 26–34)
MCHC RBC AUTO-ENTMCNC: 33 G/DL (ref 31–36)
MCV RBC AUTO: 84.6 FL (ref 80–100)
PATH INTERP BLD-IMP: NO
PERFORMED ON: ABNORMAL
PLATELET # BLD AUTO: 246 K/UL (ref 135–450)
PMV BLD AUTO: 8.5 FL (ref 5–10.5)
POTASSIUM SERPL-SCNC: 3.9 MMOL/L (ref 3.5–5.1)
RBC # BLD AUTO: 3.05 M/UL (ref 4–5.2)
SODIUM SERPL-SCNC: 136 MMOL/L (ref 136–145)
WBC # BLD AUTO: 1.9 K/UL (ref 4–11)

## 2023-10-16 PROCEDURE — 93306 TTE W/DOPPLER COMPLETE: CPT

## 2023-10-16 PROCEDURE — 80048 BASIC METABOLIC PNL TOTAL CA: CPT

## 2023-10-16 PROCEDURE — 85027 COMPLETE CBC AUTOMATED: CPT

## 2023-10-16 PROCEDURE — 6370000000 HC RX 637 (ALT 250 FOR IP): Performed by: NURSE PRACTITIONER

## 2023-10-16 PROCEDURE — 36415 COLL VENOUS BLD VENIPUNCTURE: CPT

## 2023-10-16 PROCEDURE — 6360000002 HC RX W HCPCS: Performed by: NURSE PRACTITIONER

## 2023-10-16 PROCEDURE — 1200000000 HC SEMI PRIVATE

## 2023-10-16 RX ORDER — CIPROFLOXACIN 500 MG/1
500 TABLET, FILM COATED ORAL EVERY 12 HOURS SCHEDULED
Status: DISCONTINUED | OUTPATIENT
Start: 2023-10-16 | End: 2023-10-17 | Stop reason: HOSPADM

## 2023-10-16 RX ORDER — KETOROLAC TROMETHAMINE 30 MG/ML
30 INJECTION, SOLUTION INTRAMUSCULAR; INTRAVENOUS ONCE
Status: COMPLETED | OUTPATIENT
Start: 2023-10-16 | End: 2023-10-16

## 2023-10-16 RX ORDER — ASCORBIC ACID 250 MG
250 TABLET ORAL 2 TIMES DAILY
Qty: 60 TABLET | Refills: 0 | Status: SHIPPED | OUTPATIENT
Start: 2023-10-16

## 2023-10-16 RX ORDER — CIPROFLOXACIN 500 MG/1
500 TABLET, FILM COATED ORAL EVERY 12 HOURS SCHEDULED
Qty: 28 TABLET | Refills: 0 | Status: SHIPPED | OUTPATIENT
Start: 2023-10-16 | End: 2023-10-30

## 2023-10-16 RX ORDER — FERROUS SULFATE 325(65) MG
325 TABLET ORAL 2 TIMES DAILY
Qty: 60 TABLET | Refills: 5 | Status: SHIPPED | OUTPATIENT
Start: 2023-10-16

## 2023-10-16 RX ORDER — PSEUDOEPHEDRINE HCL 30 MG
200 TABLET ORAL 2 TIMES DAILY
Qty: 60 CAPSULE | Refills: 0 | Status: SHIPPED | OUTPATIENT
Start: 2023-10-16 | End: 2023-11-15

## 2023-10-16 RX ORDER — POLYETHYLENE GLYCOL 3350 17 G/17G
17 POWDER, FOR SOLUTION ORAL DAILY
Qty: 30 PACKET | Refills: 0 | Status: SHIPPED | OUTPATIENT
Start: 2023-10-17 | End: 2023-11-16

## 2023-10-16 RX ADMIN — HYDROCODONE BITARTRATE AND ACETAMINOPHEN 1 TABLET: 5; 325 TABLET ORAL at 01:56

## 2023-10-16 RX ADMIN — FLUTICASONE PROPIONATE 1 SPRAY: 50 SPRAY, METERED NASAL at 08:43

## 2023-10-16 RX ADMIN — DOCUSATE SODIUM 200 MG: 100 CAPSULE, LIQUID FILLED ORAL at 08:42

## 2023-10-16 RX ADMIN — SERTRALINE 50 MG: 50 TABLET, FILM COATED ORAL at 08:55

## 2023-10-16 RX ADMIN — INSULIN LISPRO 4 UNITS: 100 INJECTION, SOLUTION INTRAVENOUS; SUBCUTANEOUS at 12:21

## 2023-10-16 RX ADMIN — DOCUSATE SODIUM 200 MG: 100 CAPSULE, LIQUID FILLED ORAL at 19:40

## 2023-10-16 RX ADMIN — CIPROFLOXACIN 400 MG: 400 INJECTION, SOLUTION INTRAVENOUS at 02:04

## 2023-10-16 RX ADMIN — INSULIN LISPRO 4 UNITS: 100 INJECTION, SOLUTION INTRAVENOUS; SUBCUTANEOUS at 17:11

## 2023-10-16 RX ADMIN — POLYETHYLENE GLYCOL 3350 17 G: 17 POWDER, FOR SOLUTION ORAL at 08:42

## 2023-10-16 RX ADMIN — CIPROFLOXACIN 500 MG: 500 TABLET, FILM COATED ORAL at 19:40

## 2023-10-16 RX ADMIN — CETIRIZINE HYDROCHLORIDE 10 MG: 10 TABLET ORAL at 08:42

## 2023-10-16 RX ADMIN — INSULIN GLARGINE 15 UNITS: 100 INJECTION, SOLUTION SUBCUTANEOUS at 08:42

## 2023-10-16 RX ADMIN — FILGRASTIM-AAFI 480 MCG: 480 INJECTION, SOLUTION SUBCUTANEOUS at 17:12

## 2023-10-16 RX ADMIN — IRON SUCROSE 200 MG: 20 INJECTION, SOLUTION INTRAVENOUS at 14:41

## 2023-10-16 RX ADMIN — ROSUVASTATIN CALCIUM 20 MG: 20 TABLET, FILM COATED ORAL at 19:40

## 2023-10-16 RX ADMIN — KETOROLAC TROMETHAMINE 30 MG: 30 INJECTION, SOLUTION INTRAMUSCULAR; INTRAVENOUS at 14:41

## 2023-10-16 ASSESSMENT — PAIN DESCRIPTION - LOCATION: LOCATION: HEAD

## 2023-10-16 ASSESSMENT — PAIN SCALES - GENERAL
PAINLEVEL_OUTOF10: 0
PAINLEVEL_OUTOF10: 7

## 2023-10-16 NOTE — PROGRESS NOTES
CLINICAL PHARMACY NOTE: MEDS TO BEDS    Discharge medications are ready in inpatient pharmacy for after hours delivery.     10/16/23 3:23 PM

## 2023-10-16 NOTE — PROGRESS NOTES
Physician Progress Note      PATIENT:               Sheryl Aguilar  [facility] #:                  [id number]  :                       1970  ADMIT DATE:       10/13/2023 1:56 AM  DISCH DATE:  RESPONDING  PROVIDER #:        Matilda Ryan MD          QUERY TEXT:    Pt admitted with Acute pyelonephritis. Pt noted to have tachycardia,   tachypnea, WBC 3.0, 2.3, procalcitonin 0.34 . If possible, please document in   the progress notes and discharge summary if you are evaluating and /or   treating any of the following: The medical record reflects the following:    Risk Factors: the 48 yrs old male , Acute pyelonephritis, kathy. Hx- DM2  Clinical Indicators: Vs- P 105,  RR 25. ... H&P 10/13-Acute   pyelonephritis. ...WBC  10/13- 3.0, 10/14 -2.3, 10/15-2.3, 10/16-1.9. Ronit Jordan Ronit Jordan Ronit Jordan Procalcitonin 10/13- 0.34. Ronit Jordan Ronit Jordan U/A- large blood, + Nitrite, Moderate leuk, 2+   bacteria, 603 WBC, mucus present. ...urine culture- Klebsiella pneumoniae  Treatment: IV Rocephin, Urine culture, Cipro IV    Thank You,  Zora Bolden RN BSN CDS CRCR  [email or web address]  Options provided:  -- Sepsis due to Acute pyelonephritis, present on admission  -- Acute pyelonephritis without Sepsis  -- Other - I will add my own diagnosis  -- Disagree - Not applicable / Not valid  -- Disagree - Clinically unable to determine / Unknown  -- Refer to Clinical Documentation Reviewer    PROVIDER RESPONSE TEXT:    This patient has sepsis due to Acute pyelonephritis, which was present on   admission.     Query created by: Zora Bolden on 10/16/2023 10:00 AM      Electronically signed by:  Matilda Ryan MD 10/16/2023 12:24 PM

## 2023-10-16 NOTE — PLAN OF CARE
Problem: Discharge Planning  Goal: Discharge to home or other facility with appropriate resources  10/16/2023 1114 by Franko Hernandez RN  Flowsheets (Taken 10/16/2023 1114)  Discharge to home or other facility with appropriate resources:   Identify barriers to discharge with patient and caregiver   Arrange for needed discharge resources and transportation as appropriate     Problem: Pain  Goal: Verbalizes/displays adequate comfort level or baseline comfort level  10/16/2023 1114 by Lynette Escudero RN  Flowsheets (Taken 10/16/2023 1114)  Verbalizes/displays adequate comfort level or baseline comfort level:   Encourage patient to monitor pain and request assistance   Assess pain using appropriate pain scale     Problem: Safety - Adult  Goal: Free from fall injury  10/16/2023 1114 by Franko Hernandez RN  Flowsheets (Taken 10/16/2023 1114)  Free From Fall Injury: Instruct family/caregiver on patient safety     Problem: Chronic Conditions and Co-morbidities  Goal: Patient's chronic conditions and co-morbidity symptoms are monitored and maintained or improved  Flowsheets (Taken 10/16/2023 1114)  Care Plan - Patient's Chronic Conditions and Co-Morbidity Symptoms are Monitored and Maintained or Improved: Monitor and assess patient's chronic conditions and comorbid symptoms for stability, deterioration, or improvement

## 2023-10-16 NOTE — PROGRESS NOTES
Pt AOX4. VSS on room air. All morning medications given per order. Denies pain or discomfort at this time. No further needs at this time. Call light within reach.

## 2023-10-16 NOTE — PROGRESS NOTES
Hospitalist Progress Note      PCP: Kolton Medley MD    Date of Admission: 10/13/2023    Chief Complaint: Left flank pain    Hospital Course: Celia Pillai is a 48 y.o. female with PMH as below presented to the ER/transfer with c/o Pain in left flank region. Patient states that she has been having pain for the last 3 days in the left flank region intensity is 8/10 associated with nausea and dysuria she states that she does have history of bladder cancer, denies any fever or chills came to ED for further evaluation work-up, patient was empirically given IV antibiotic and initial plan was to discharge home however when she was ambulated she became hypoxic on room air with SPO2 of 84% on room air with no history of sleep apnea. She was subsequently admitted for hypoxia CT PE was done which was negative for pulm medicine. Admitted for further treatment and work-up. Subjective: Patient reports she continues to feel improved today. No shortness of breath and is currently on room air. She denies any chest pain sob palpitation abdominal pain headache lightheadedness or dizziness. Reviewed plan of care, denies questions. Awaiting echo results and heme consult. 1730 update: called and spoke to pt on phone. Reviewed echo results, agree with cardiology consult for new dx of diastolic chf, pulmonary HTN. Will need sleep study out pt. Heme evalauted, ordered granix and wants to re evaluate tomorrow. Assessment/Plan:    Hypoxia  -Patient denied any increased shortness of breath or increased work of breathing. Reports when saturations were low in the ER, she felt \"normal\". -O2 weaned off, continue to monitor. Saturation 94% on room air with ambulation.  -May be secondary to undiagnosed CHF, as BNP is elevated. -CT PE study was negative for pulmonary emboli and pneumonia.   Although procalcitonin is elevated 0.34, which may be related to pyelonephritis  -echo pending    Left-sided acute

## 2023-10-16 NOTE — PLAN OF CARE
Problem: Discharge Planning  Goal: Discharge to home or other facility with appropriate resources  10/16/2023 0402 by Atilio Lopez RN  Outcome: Progressing  10/15/2023 1936 by Pito Simpson RN  Outcome: Progressing     Problem: Pain  Goal: Verbalizes/displays adequate comfort level or baseline comfort level  10/16/2023 0402 by Atilio Lopez RN  Outcome: Progressing  10/15/2023 1936 by Pito Simpson RN  Outcome: Progressing     Problem: Safety - Adult  Goal: Free from fall injury  10/16/2023 0402 by Atilio Lopez RN  Outcome: Progressing  10/15/2023 1936 by Pito Simpson RN  Outcome: Progressing

## 2023-10-17 VITALS
TEMPERATURE: 98.9 F | WEIGHT: 254.85 LBS | DIASTOLIC BLOOD PRESSURE: 71 MMHG | RESPIRATION RATE: 16 BRPM | HEIGHT: 67 IN | OXYGEN SATURATION: 92 % | BODY MASS INDEX: 40 KG/M2 | SYSTOLIC BLOOD PRESSURE: 120 MMHG | HEART RATE: 78 BPM

## 2023-10-17 LAB
ANION GAP SERPL CALCULATED.3IONS-SCNC: 6 MMOL/L (ref 3–16)
BUN SERPL-MCNC: 21 MG/DL (ref 7–20)
CALCIUM SERPL-MCNC: 8.2 MG/DL (ref 8.3–10.6)
CHLORIDE SERPL-SCNC: 103 MMOL/L (ref 99–110)
CO2 SERPL-SCNC: 29 MMOL/L (ref 21–32)
CREAT SERPL-MCNC: 0.8 MG/DL (ref 0.6–1.1)
DEPRECATED RDW RBC AUTO: 20.6 % (ref 12.4–15.4)
GFR SERPLBLD CREATININE-BSD FMLA CKD-EPI: >60 ML/MIN/{1.73_M2}
GLUCOSE BLD-MCNC: 156 MG/DL (ref 70–99)
GLUCOSE BLD-MCNC: 293 MG/DL (ref 70–99)
GLUCOSE SERPL-MCNC: 157 MG/DL (ref 70–99)
HCT VFR BLD AUTO: 26.8 % (ref 36–48)
HGB BLD-MCNC: 9.1 G/DL (ref 12–16)
MCH RBC QN AUTO: 28.6 PG (ref 26–34)
MCHC RBC AUTO-ENTMCNC: 34 G/DL (ref 31–36)
MCV RBC AUTO: 84.4 FL (ref 80–100)
PERFORMED ON: ABNORMAL
PERFORMED ON: ABNORMAL
PLATELET # BLD AUTO: 259 K/UL (ref 135–450)
PMV BLD AUTO: 8.1 FL (ref 5–10.5)
POTASSIUM SERPL-SCNC: 3.9 MMOL/L (ref 3.5–5.1)
RBC # BLD AUTO: 3.18 M/UL (ref 4–5.2)
SODIUM SERPL-SCNC: 138 MMOL/L (ref 136–145)
WBC # BLD AUTO: 3 K/UL (ref 4–11)

## 2023-10-17 PROCEDURE — 6360000002 HC RX W HCPCS: Performed by: INTERNAL MEDICINE

## 2023-10-17 PROCEDURE — 6360000002 HC RX W HCPCS: Performed by: NURSE PRACTITIONER

## 2023-10-17 PROCEDURE — 36415 COLL VENOUS BLD VENIPUNCTURE: CPT

## 2023-10-17 PROCEDURE — 80048 BASIC METABOLIC PNL TOTAL CA: CPT

## 2023-10-17 PROCEDURE — 85027 COMPLETE CBC AUTOMATED: CPT

## 2023-10-17 PROCEDURE — 6370000000 HC RX 637 (ALT 250 FOR IP): Performed by: NURSE PRACTITIONER

## 2023-10-17 PROCEDURE — 6370000000 HC RX 637 (ALT 250 FOR IP): Performed by: INTERNAL MEDICINE

## 2023-10-17 RX ORDER — FUROSEMIDE 10 MG/ML
20 INJECTION INTRAMUSCULAR; INTRAVENOUS ONCE
Status: COMPLETED | OUTPATIENT
Start: 2023-10-17 | End: 2023-10-17

## 2023-10-17 RX ORDER — FUROSEMIDE 20 MG/1
20 TABLET ORAL PRN
Qty: 60 TABLET | Refills: 3 | Status: SHIPPED | OUTPATIENT
Start: 2023-10-18

## 2023-10-17 RX ORDER — FUROSEMIDE 20 MG/1
20 TABLET ORAL PRN
Status: DISCONTINUED | OUTPATIENT
Start: 2023-10-18 | End: 2023-10-17 | Stop reason: HOSPADM

## 2023-10-17 RX ADMIN — DOCUSATE SODIUM 200 MG: 100 CAPSULE, LIQUID FILLED ORAL at 08:19

## 2023-10-17 RX ADMIN — FLUTICASONE PROPIONATE 1 SPRAY: 50 SPRAY, METERED NASAL at 08:20

## 2023-10-17 RX ADMIN — INSULIN GLARGINE 15 UNITS: 100 INJECTION, SOLUTION SUBCUTANEOUS at 08:19

## 2023-10-17 RX ADMIN — POLYETHYLENE GLYCOL 3350 17 G: 17 POWDER, FOR SOLUTION ORAL at 08:19

## 2023-10-17 RX ADMIN — FUROSEMIDE 20 MG: 10 INJECTION, SOLUTION INTRAMUSCULAR; INTRAVENOUS at 16:04

## 2023-10-17 RX ADMIN — CETIRIZINE HYDROCHLORIDE 10 MG: 10 TABLET ORAL at 08:19

## 2023-10-17 RX ADMIN — EMPAGLIFLOZIN 10 MG: 10 TABLET, FILM COATED ORAL at 16:05

## 2023-10-17 RX ADMIN — METOPROLOL SUCCINATE 50 MG: 50 TABLET, EXTENDED RELEASE ORAL at 08:19

## 2023-10-17 RX ADMIN — CIPROFLOXACIN 500 MG: 500 TABLET, FILM COATED ORAL at 08:19

## 2023-10-17 RX ADMIN — SERTRALINE 50 MG: 50 TABLET, FILM COATED ORAL at 08:19

## 2023-10-17 RX ADMIN — INSULIN LISPRO 8 UNITS: 100 INJECTION, SOLUTION INTRAVENOUS; SUBCUTANEOUS at 12:22

## 2023-10-17 RX ADMIN — IRON SUCROSE 200 MG: 20 INJECTION, SOLUTION INTRAVENOUS at 13:56

## 2023-10-17 RX ADMIN — FILGRASTIM-AAFI 480 MCG: 480 INJECTION, SOLUTION SUBCUTANEOUS at 15:34

## 2023-10-17 NOTE — PLAN OF CARE
Problem: Discharge Planning  Goal: Discharge to home or other facility with appropriate resources  10/17/2023 1719 by Jason Alvarado RN  Outcome: Completed  10/17/2023 0935 by Jason Alvarado RN  Flowsheets (Taken 10/17/2023 0935)  Discharge to home or other facility with appropriate resources:   Identify barriers to discharge with patient and caregiver   Arrange for needed discharge resources and transportation as appropriate     Problem: Pain  Goal: Verbalizes/displays adequate comfort level or baseline comfort level  10/17/2023 1719 by Jason Alvarado RN  Outcome: Completed  10/17/2023 0935 by Jason Alvarado RN  Flowsheets (Taken 10/17/2023 0935)  Verbalizes/displays adequate comfort level or baseline comfort level:   Encourage patient to monitor pain and request assistance   Assess pain using appropriate pain scale     Problem: Safety - Adult  Goal: Free from fall injury  10/17/2023 1719 by Jason Alvarado RN  Outcome: Completed  10/17/2023 0935 by Jason Alvarado RN  Flowsheets (Taken 10/17/2023 0935)  Free From Fall Injury: Instruct family/caregiver on patient safety     Problem: Chronic Conditions and Co-morbidities  Goal: Patient's chronic conditions and co-morbidity symptoms are monitored and maintained or improved  Outcome: Completed     Problem: Gastrointestinal - Adult  Goal: Maintains adequate nutritional intake  Outcome: Completed     Problem: Genitourinary - Adult  Goal: Absence of urinary retention  Outcome: Completed     Problem: Metabolic/Fluid and Electrolytes - Adult  Goal: Electrolytes maintained within normal limits  Outcome: Completed     Problem: Hematologic - Adult  Goal: Maintains hematologic stability  Outcome: Completed

## 2023-10-17 NOTE — CONSULTS
Oncology Hematology Care    Consult Note      Requesting Physician: Brittany Maki     CHIEF COMPLAINT:  Leukopenia       HISTORY OF PRESENT ILLNESS:    Ms. Enedelia Kerr  is a 48 y.o. female we are seeing in consultation for leukopenia. Patient well known to our practice follows  for bladder cancer. She is admitted for left pyelonephritis. Oncologic history; Muscle invasive bladder cancer. She presented with gross hematuria. A cystoscopy by Dr. Hannah Bernardo on 11/18/2021 revealed a high-grade appearing sessile angry-looking bladder tumor involving the right ureter surface. 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 A CT scan showed a 1 cm indeterminate right adrenal lesion. A subsequent PET/CT showed uptake in the pelvis and lumbar spine. The adrenal lesion is felt to be most likely an adenoma. A subsequent MRI of her L-spine and pelvis were negative for cancer. Previous therapy; neoadjuvant Gemzar plus cisplatin for 4 cycles--completed 3/9/2022. She refused surgery. She then received weekly Marielos and Taxol with definitive radiation therapy-finished June 2022     Has been leukopenic per our records,  felt it likely left over from chemo. Patient is still passing large blood clots, unclear if this is coming from there urethra or the vagina. Last cysto 7/23 and restaging CT negative. She has seen Dr. Sarah Whitlock with OBGYN, who has since referred her to gyn onc Chayo Rubin for further eval she has an appointment next Tuesday. Urology plans on repeating a cysto to eval for possible hematuria outpatient. ICD-10-CM    1. Hypoxia  R09.02       2.  Acute pyelonephritis  N10              Past Medical History:  Past Medical History:   Diagnosis Date    Allergic rhinitis     Elevated blood calculus in the  left kidney. The left calices and proximal ureter are not dilated. There is  a punctate calculus in the left lateral pelvis on series 2 image 169 but this  appears to have been present on the previous study outside the ureter. GI/Bowel: Stomach, small bowel, and colon are not dilated. There is no  evidence appendicitis or colonic diverticulitis. Mild constipation of the  right hemicolon. Distal colon and rectum are collapsed. There is no ascites  or pneumoperitoneum. Pelvis: The urinary bladder is collapsed but the wall is diffusely thickened. There is edema in the perivesicular fat. Uterus is not enlarged. Peritoneum/Retroperitoneum: No abdominal aortic aneurysm or retroperitoneal  hematoma. No bulky lymphadenopathy. Bones/Soft Tissues: Body wall appears acceptable. Degenerative changes at  the sacroiliac joints with vacuum phenomenon. No acute fracture or  dislocation at the pelvis and hips. No collapse or subluxation at the lumbar  spine. Impression: The left kidney is edematous with perinephric stranding more than the right  side. There is mild distension of the left renal calices and proximal ureter  but without a definite ureteral calculus. Correlate clinically for recent  passage of a stone versus ascending urinary tract infection and  pyelonephritis. The urinary bladder is under distended but the wall is thickened with  perivesicular edema indicating prominent cystitis. .    Constipation in the right hemicolon but there is no obstruction, ascites, or  pneumoperitoneum. Multiple small nodules within the right and left adrenal gland are unchanged  from 07/13/2022. Most likely representing benign adenomas and no dedicated  follow-up is recommended. Problem List  Patient Active Problem List   Diagnosis    Allergic rhinitis    Vitamin D deficiency    Type 2 diabetes mellitus without complication ([address] ) dx 9/74.     Obesity, morbid, BMI 40.0-49.9 ([address])    Pure

## 2023-10-17 NOTE — PROGRESS NOTES
Pt discharged. IV taken out. No complications. All meds delivered to the patient. All paper work reviewed. All questions answered. Pt denied transport via wheelchair.

## 2023-10-17 NOTE — PLAN OF CARE
Problem: Discharge Planning  Goal: Discharge to home or other facility with appropriate resources  10/17/2023 0111 by Jean Paul Blas RN  Outcome: Progressing     Problem: Pain  Goal: Verbalizes/displays adequate comfort level or baseline comfort level  10/17/2023 0111 by Jean Paul Blas RN  Outcome: Progressing     Problem: Safety - Adult  Goal: Free from fall injury  10/17/2023 0111 by Jean Paul Blas RN  Outcome: Progressing     Problem: Chronic Conditions and Co-morbidities  Goal: Patient's chronic conditions and co-morbidity symptoms are monitored and maintained or improved  10/17/2023 0111 by Jean Paul Blas RN  Outcome: Progressing     Problem: Gastrointestinal - Adult  Goal: Maintains adequate nutritional intake  Outcome: Progressing     Problem: Genitourinary - Adult  Goal: Absence of urinary retention  Outcome: Progressing     Problem: Metabolic/Fluid and Electrolytes - Adult  Goal: Electrolytes maintained within normal limits  Outcome: Progressing     Problem: Hematologic - Adult  Goal: Maintains hematologic stability  Outcome: Progressing

## 2023-10-17 NOTE — DISCHARGE INSTRUCTIONS
Please follow up with PCP in 1-2 weeks for post hospital care and DM med management. Please return to ER for worsening bleeding or shortness of breath. Please follow up with Hematology/oncology and urology as scheduled.

## 2023-10-17 NOTE — PLAN OF CARE
Problem: Discharge Planning  Goal: Discharge to home or other facility with appropriate resources  10/17/2023 0935 by Deven Pulliam RN  Flowsheets (Taken 10/17/2023 0935)  Discharge to home or other facility with appropriate resources:   Identify barriers to discharge with patient and caregiver   Arrange for needed discharge resources and transportation as appropriate     Problem: Pain  Goal: Verbalizes/displays adequate comfort level or baseline comfort level  10/17/2023 0935 by Deven Pulliam RN  Flowsheets (Taken 10/17/2023 0935)  Verbalizes/displays adequate comfort level or baseline comfort level:   Encourage patient to monitor pain and request assistance   Assess pain using appropriate pain scale     Problem: Safety - Adult  Goal: Free from fall injury  10/17/2023 0935 by Deven Pulliam RN  Flowsheets (Taken 10/17/2023 0935)  Free From Fall Injury: Instruct family/caregiver on patient safety

## 2023-10-17 NOTE — PROGRESS NOTES
Pt alert and oriented x4. Pt denies any pain at this time. Pt tolerated night medications. Pt verbalized understanding of education. VSS. Standard safety measures in place. Needed items within reach. Call light within reach.

## 2023-10-17 NOTE — PROGRESS NOTES
Pt AOX4. VSS on room air. All morning medications given per order. Denies pain or discomfort. No further needs at this time. Call light within reach.

## 2023-10-18 ENCOUNTER — TELEPHONE (OUTPATIENT)
Dept: FAMILY MEDICINE CLINIC | Age: 53
End: 2023-10-18

## 2023-10-18 NOTE — TELEPHONE ENCOUNTER
Care Transitions Initial Follow Up Call    Outreach made within 2 business days of discharge: Yes    Patient: Jamey Silvestre Patient : 1970   MRN: [id number]  Reason for Admission: There are no discharge diagnoses documented for the most recent discharge. Discharge Date: 10/17/23       Spoke with: simon    Discharge department/facility: [facility] Interactive Patient Contact:  Was patient able to fill all prescriptions: Yes  Was patient instructed to bring all medications to the follow-up visit: Yes  Is patient taking all medications as directed in the discharge summary?  NA  Does patient understand their discharge instructions: No: NA  Does patient have questions or concerns that need addressed prior to 7-14 day follow up office visit: no    Scheduled appointment with PCP within 7-14 days    Follow Up  Future Appointments   Date Time Provider [address] [place] [address]   10/20/2023  9:40 AM Jackeline Stinson MD [address] [place] [address]   2023  1:40 PM Jackeline Stinson MD [facility] [address] [place], [place]

## 2023-10-19 ENCOUNTER — TELEPHONE (OUTPATIENT)
Dept: FAMILY MEDICINE CLINIC | Age: 53
End: 2023-10-19

## 2023-10-19 NOTE — TELEPHONE ENCOUNTER
Pt called in has appointment tomorrow a HFU   Pt blood sugars are 474 she wants to use some of her mom's insulin until seen tomorrow       Please advise

## 2023-10-20 ENCOUNTER — OFFICE VISIT (OUTPATIENT)
Dept: FAMILY MEDICINE CLINIC | Age: 53
End: 2023-10-20

## 2023-10-20 VITALS
HEIGHT: 67 IN | DIASTOLIC BLOOD PRESSURE: 70 MMHG | WEIGHT: 253.6 LBS | BODY MASS INDEX: 39.8 KG/M2 | OXYGEN SATURATION: 98 % | HEART RATE: 83 BPM | RESPIRATION RATE: 16 BRPM | TEMPERATURE: 98.1 F | SYSTOLIC BLOOD PRESSURE: 122 MMHG

## 2023-10-20 DIAGNOSIS — Z09 HOSPITAL DISCHARGE FOLLOW-UP: Primary | ICD-10-CM

## 2023-10-20 DIAGNOSIS — I80.8 SUPERFICIAL THROMBOPHLEBITIS OF LEFT UPPER EXTREMITY: ICD-10-CM

## 2023-10-20 DIAGNOSIS — D50.0 IRON DEFICIENCY ANEMIA DUE TO CHRONIC BLOOD LOSS: ICD-10-CM

## 2023-10-20 DIAGNOSIS — E11.9 DIABETES MELLITUS WITHOUT COMPLICATION (HCC): ICD-10-CM

## 2023-10-20 DIAGNOSIS — R31.0 GROSS HEMATURIA: ICD-10-CM

## 2023-10-20 DIAGNOSIS — I50.32 CHRONIC DIASTOLIC CONGESTIVE HEART FAILURE (HCC): ICD-10-CM

## 2023-10-20 DIAGNOSIS — K59.00 CONSTIPATION, UNSPECIFIED CONSTIPATION TYPE: ICD-10-CM

## 2023-10-20 ASSESSMENT — ENCOUNTER SYMPTOMS
NAUSEA: 0
ABDOMINAL DISTENTION: 0
COUGH: 0
DIARRHEA: 0
VOMITING: 0
SHORTNESS OF BREATH: 0
ABDOMINAL PAIN: 0

## 2023-10-26 RX ORDER — GLIMEPIRIDE 4 MG/1
4 TABLET ORAL EVERY MORNING
Qty: 90 TABLET | Refills: 1 | Status: SHIPPED | OUTPATIENT
Start: 2023-10-26

## 2023-11-27 RX ORDER — METOPROLOL SUCCINATE 50 MG/1
50 TABLET, EXTENDED RELEASE ORAL DAILY
Qty: 90 TABLET | Refills: 1 | Status: SHIPPED | OUTPATIENT
Start: 2023-11-27

## 2023-12-14 RX ORDER — POLYETHYLENE GLYCOL 3350 17 G/17G
17 POWDER, FOR SOLUTION ORAL DAILY
Qty: 30 PACKET | Refills: 3 | Status: SHIPPED | OUTPATIENT
Start: 2023-12-14

## 2023-12-18 ENCOUNTER — TELEPHONE (OUTPATIENT)
Dept: FAMILY MEDICINE CLINIC | Age: 53
End: 2023-12-18

## 2023-12-19 NOTE — TELEPHONE ENCOUNTER
Submitted PA for Polyethylene Glycol 3350 17GM packets   Via Client Outlook Key: [id number]  STATUS: NOT SENT. In order to submit this PA - We need a DX and ICD-10 Code. If this requires a response please respond to the pool ( P [phone]). Thank you please advise patient.

## 2023-12-19 NOTE — TELEPHONE ENCOUNTER
Submitted PA for Polyethylene Glycol 3350 17GM packets  Via Zing Key: [id number] STATUS: PENDING. Follow up done daily; if no decision with in three days we will refax. If another three days goes by with no decision will call the insurance for status.

## 2023-12-22 NOTE — TELEPHONE ENCOUNTER
Called Manuel at [phone]. This case is still under review per automated system.  [address] [place] [address] # [id number]

## 2023-12-27 NOTE — TELEPHONE ENCOUNTER
Called [employer] for follow up. Automated system advised this was still in review process. Pressed option to speak with representative. Spoke with Timotyh Pena and was advised that they will need to close previous case a resubmit it. She had spoke with the review team and they cannot locate the case. Completed over the phone. Case# [id number]     This can take 24 to 48 hours to receive determination.

## 2023-12-28 NOTE — TELEPHONE ENCOUNTER
Called and spoke with the patient she went and ordered Miralax from [address] [place] [address]. No further questions.

## 2024-01-02 RX ORDER — TIRZEPATIDE 10 MG/.5ML
INJECTION, SOLUTION SUBCUTANEOUS
Qty: 2 ML | Refills: 2 | Status: SHIPPED | OUTPATIENT
Start: 2024-01-02

## 2024-01-22 ENCOUNTER — OFFICE VISIT (OUTPATIENT)
Dept: FAMILY MEDICINE CLINIC | Age: 54
End: 2024-01-22
Payer: COMMERCIAL

## 2024-01-22 VITALS
TEMPERATURE: 97.6 F | RESPIRATION RATE: 18 BRPM | SYSTOLIC BLOOD PRESSURE: 130 MMHG | WEIGHT: 249.4 LBS | OXYGEN SATURATION: 98 % | BODY MASS INDEX: 39.06 KG/M2 | DIASTOLIC BLOOD PRESSURE: 70 MMHG | HEART RATE: 84 BPM

## 2024-01-22 DIAGNOSIS — H91.90 HEARING LOSS, UNSPECIFIED HEARING LOSS TYPE, UNSPECIFIED LATERALITY: ICD-10-CM

## 2024-01-22 DIAGNOSIS — R20.0 BILATERAL ARM NUMBNESS AND TINGLING WHILE SLEEPING: ICD-10-CM

## 2024-01-22 DIAGNOSIS — E66.01 OBESITY, MORBID, BMI 40.0-49.9 (HCC): ICD-10-CM

## 2024-01-22 DIAGNOSIS — E11.9 TYPE 2 DIABETES MELLITUS WITHOUT COMPLICATION, WITHOUT LONG-TERM CURRENT USE OF INSULIN (HCC): Primary | ICD-10-CM

## 2024-01-22 DIAGNOSIS — R20.2 BILATERAL ARM NUMBNESS AND TINGLING WHILE SLEEPING: ICD-10-CM

## 2024-01-22 PROBLEM — J96.01 ACUTE HYPOXIC RESPIRATORY FAILURE (HCC): Status: RESOLVED | Noted: 2023-10-13 | Resolved: 2024-01-22

## 2024-01-22 LAB — HBA1C MFR BLD: 6.7 %

## 2024-01-22 PROCEDURE — 3044F HG A1C LEVEL LT 7.0%: CPT | Performed by: FAMILY MEDICINE

## 2024-01-22 PROCEDURE — 83036 HEMOGLOBIN GLYCOSYLATED A1C: CPT | Performed by: FAMILY MEDICINE

## 2024-01-22 PROCEDURE — 3075F SYST BP GE 130 - 139MM HG: CPT | Performed by: FAMILY MEDICINE

## 2024-01-22 PROCEDURE — 3078F DIAST BP <80 MM HG: CPT | Performed by: FAMILY MEDICINE

## 2024-01-22 PROCEDURE — 99214 OFFICE O/P EST MOD 30 MIN: CPT | Performed by: FAMILY MEDICINE

## 2024-01-22 RX ORDER — ESTRADIOL 0.1 MG/G
2 CREAM VAGINAL
COMMUNITY

## 2024-01-22 ASSESSMENT — PATIENT HEALTH QUESTIONNAIRE - PHQ9
SUM OF ALL RESPONSES TO PHQ QUESTIONS 1-9: 0
SUM OF ALL RESPONSES TO PHQ QUESTIONS 1-9: 0
2. FEELING DOWN, DEPRESSED OR HOPELESS: 0
SUM OF ALL RESPONSES TO PHQ9 QUESTIONS 1 & 2: 0
SUM OF ALL RESPONSES TO PHQ QUESTIONS 1-9: 0
1. LITTLE INTEREST OR PLEASURE IN DOING THINGS: 0
SUM OF ALL RESPONSES TO PHQ QUESTIONS 1-9: 0

## 2024-01-22 NOTE — PROGRESS NOTES
MD   vitamin B-12 (CYANOCOBALAMIN) 500 MCG tablet Take 2 tablets by mouth daily Yes Klaudia Prekins MD   vitamin D3 (CHOLECALCIFEROL) 25 MCG (1000 UT) TABS tablet Take 3 tablets by mouth daily Yes Klaudia Perkins MD   Omega-3 1000 MG CAPS Take 2 capsules by mouth daily Yes Klaudia Perkins MD   metFORMIN (GLUCOPHAGE) 1000 MG tablet TAKE 1 TABLET BY MOUTH TWICE DAILY WITH MEALS  Patient taking differently: Take 1 tablet by mouth daily (with breakfast) Yes Vasile Jackson MD   empagliflozin (JARDIANCE) 10 MG tablet Take 1 tablet by mouth daily  Patient not taking: Reported on 10/20/2023  Ryanne Reza PA-C   furosemide (LASIX) 20 MG tablet Take 1 tablet by mouth as needed (daily as needed for pedal edema)  Patient not taking: Reported on 10/20/2023  Ryanne Reza PA-C   ferrous sulfate (IRON 325) 325 (65 Fe) MG tablet Take 1 tablet by mouth in the morning and at bedtime  Patient not taking: Reported on 2024  Brittany Maki, APRN - CNP   Multiple Vitamins-Minerals (THERAPEUTIC MULTIVITAMIN-MINERALS) tablet Take 1 tablet by mouth daily  Patient not taking: Reported on 10/20/2023  Klaudia Perkins MD        Social History     Tobacco Use    Smoking status: Former     Current packs/day: 0.00     Average packs/day: 1 pack/day for 19.0 years (19.0 ttl pk-yrs)     Types: Cigarettes     Start date: 1988     Quit date: 2007     Years since quittin.0    Smokeless tobacco: Never   Substance Use Topics    Alcohol use: Yes     Alcohol/week: 0.0 standard drinks of alcohol     Comment: rarely    Drug use: No       Review of Systems As above     Physical Exam  Vitals and nursing note reviewed.   Constitutional:       Appearance: Normal appearance. She is well-developed.   HENT:      Right Ear: Tympanic membrane, ear canal and external ear normal. There is no impacted cerumen.      Left Ear: Tympanic membrane, ear canal and external ear normal. There is no impacted cerumen.

## 2024-01-23 RX ORDER — ROSUVASTATIN CALCIUM 20 MG/1
20 TABLET, COATED ORAL DAILY
Qty: 90 TABLET | Refills: 1 | Status: SHIPPED | OUTPATIENT
Start: 2024-01-23

## 2024-02-14 DIAGNOSIS — E11.9 TYPE 2 DIABETES MELLITUS WITHOUT COMPLICATION, WITHOUT LONG-TERM CURRENT USE OF INSULIN (HCC): ICD-10-CM

## 2024-02-16 NOTE — TELEPHONE ENCOUNTER
Malcolm Jaramillo didn't catch what she was diagnosed with. Do you know?
PT WAS JUST DIAGNOSED WITH SOMETHING. AND SHE IS NOT TAKING IT WELL.      WANTS A MILD ANTIDEPRESSANT   [facility] [address] [place]
Talked to [address] [place] [address]. She's understandably upset about discovering she has bladder cancer today. We discussed her situation. Advised to NOT research things on the Internet, listen to her cancer doctors, take a trusted loved one to [facility] oncology appointments (if possible, someone who has had cancer),   Going for CT scans tomorrow- hold metformin tonight and tomorrow. Hydrate well. Rx ativan for sleep/calm. Call for any needs.
Statement Selected

## 2024-03-25 RX ORDER — TIRZEPATIDE 10 MG/.5ML
INJECTION, SOLUTION SUBCUTANEOUS
Qty: 2 ML | Refills: 2 | Status: SHIPPED | OUTPATIENT
Start: 2024-03-25

## 2024-05-06 ENCOUNTER — TELEPHONE (OUTPATIENT)
Dept: FAMILY MEDICINE CLINIC | Age: 54
End: 2024-05-06

## 2024-05-06 DIAGNOSIS — E11.9 TYPE 2 DIABETES MELLITUS WITHOUT COMPLICATION, WITHOUT LONG-TERM CURRENT USE OF INSULIN (HCC): ICD-10-CM

## 2024-05-06 RX ORDER — TIRZEPATIDE 10 MG/.5ML
INJECTION, SOLUTION SUBCUTANEOUS
Qty: 2 ML | Refills: 2 | Status: SHIPPED | OUTPATIENT
Start: 2024-05-06

## 2024-05-06 RX ORDER — GLIMEPIRIDE 4 MG/1
4 TABLET ORAL
Qty: 90 TABLET | Refills: 1 | Status: SHIPPED | OUTPATIENT
Start: 2024-05-06

## 2024-05-06 RX ORDER — METOPROLOL SUCCINATE 50 MG/1
50 TABLET, EXTENDED RELEASE ORAL DAILY
Qty: 90 TABLET | Refills: 1 | Status: SHIPPED | OUTPATIENT
Start: 2024-05-06

## 2024-05-06 RX ORDER — GLIMEPIRIDE 4 MG/1
4 TABLET ORAL EVERY MORNING
Qty: 90 TABLET | Refills: 1 | Status: SHIPPED | OUTPATIENT
Start: 2024-05-06

## 2024-05-06 RX ORDER — ROSUVASTATIN CALCIUM 20 MG/1
20 TABLET, COATED ORAL DAILY
Qty: 90 TABLET | Refills: 1 | Status: SHIPPED | OUTPATIENT
Start: 2024-05-06

## 2024-05-06 NOTE — TELEPHONE ENCOUNTER
Pt calling in, her insurance changed to [employer] and does not cover current pharm.

## 2024-05-23 ENCOUNTER — OFFICE VISIT (OUTPATIENT)
Dept: FAMILY MEDICINE CLINIC | Age: 54
End: 2024-05-23
Payer: COMMERCIAL

## 2024-05-23 VITALS
RESPIRATION RATE: 12 BRPM | DIASTOLIC BLOOD PRESSURE: 70 MMHG | HEIGHT: 65 IN | WEIGHT: 270 LBS | OXYGEN SATURATION: 93 % | SYSTOLIC BLOOD PRESSURE: 106 MMHG | BODY MASS INDEX: 44.98 KG/M2 | HEART RATE: 80 BPM

## 2024-05-23 DIAGNOSIS — E66.01 OBESITY, MORBID, BMI 40.0-49.9 (HCC): ICD-10-CM

## 2024-05-23 DIAGNOSIS — R06.83 SNORING: ICD-10-CM

## 2024-05-23 DIAGNOSIS — G62.0 DRUG-INDUCED PERIPHERAL NEUROPATHY (HCC): ICD-10-CM

## 2024-05-23 DIAGNOSIS — E11.9 TYPE 2 DIABETES MELLITUS WITHOUT COMPLICATION, WITHOUT LONG-TERM CURRENT USE OF INSULIN (HCC): Primary | ICD-10-CM

## 2024-05-23 DIAGNOSIS — I10 PRIMARY HYPERTENSION: ICD-10-CM

## 2024-05-23 DIAGNOSIS — Z12.11 SCREEN FOR COLON CANCER: ICD-10-CM

## 2024-05-23 DIAGNOSIS — E11.9 TYPE 2 DIABETES MELLITUS WITHOUT COMPLICATION, WITHOUT LONG-TERM CURRENT USE OF INSULIN (HCC): ICD-10-CM

## 2024-05-23 DIAGNOSIS — E78.00 PURE HYPERCHOLESTEROLEMIA: ICD-10-CM

## 2024-05-23 LAB
25(OH)D3 SERPL-MCNC: 34 NG/ML
ALBUMIN SERPL-MCNC: 3.7 G/DL (ref 3.4–5)
ALBUMIN/GLOB SERPL: 1.4 {RATIO} (ref 1.1–2.2)
ALP SERPL-CCNC: 111 U/L (ref 40–129)
ALT SERPL-CCNC: 10 U/L (ref 10–40)
ANION GAP SERPL CALCULATED.3IONS-SCNC: 9 MMOL/L (ref 3–16)
AST SERPL-CCNC: 8 U/L (ref 15–37)
BILIRUB SERPL-MCNC: 0.3 MG/DL (ref 0–1)
BUN SERPL-MCNC: 18 MG/DL (ref 7–20)
CALCIUM SERPL-MCNC: 9.2 MG/DL (ref 8.3–10.6)
CHLORIDE SERPL-SCNC: 103 MMOL/L (ref 99–110)
CHOLEST SERPL-MCNC: 140 MG/DL (ref 0–199)
CO2 SERPL-SCNC: 28 MMOL/L (ref 21–32)
CREAT SERPL-MCNC: 0.7 MG/DL (ref 0.6–1.1)
CREAT UR-MCNC: 144.4 MG/DL (ref 28–259)
GFR SERPLBLD CREATININE-BSD FMLA CKD-EPI: >90 ML/MIN/{1.73_M2}
GLUCOSE SERPL-MCNC: 151 MG/DL (ref 70–99)
HBA1C MFR BLD: 7.6 %
HDLC SERPL-MCNC: 52 MG/DL (ref 40–60)
LDLC SERPL CALC-MCNC: 77 MG/DL
MAGNESIUM SERPL-MCNC: 1.8 MG/DL (ref 1.8–2.4)
MICROALBUMIN UR DL<=1MG/L-MCNC: 13.8 MG/DL
MICROALBUMIN/CREAT UR: 95.6 MG/G (ref 0–30)
POTASSIUM SERPL-SCNC: 4.7 MMOL/L (ref 3.5–5.1)
PROT SERPL-MCNC: 6.4 G/DL (ref 6.4–8.2)
REASON FOR REJECTION: NORMAL
REJECTED TEST: NORMAL
SODIUM SERPL-SCNC: 140 MMOL/L (ref 136–145)
TRIGL SERPL-MCNC: 54 MG/DL (ref 0–150)
TSH SERPL DL<=0.005 MIU/L-ACNC: 1.2 UIU/ML (ref 0.27–4.2)
VIT B12 SERPL-MCNC: 465 PG/ML (ref 211–911)
VLDLC SERPL CALC-MCNC: 11 MG/DL

## 2024-05-23 PROCEDURE — 99214 OFFICE O/P EST MOD 30 MIN: CPT | Performed by: FAMILY MEDICINE

## 2024-05-23 PROCEDURE — 3017F COLORECTAL CA SCREEN DOC REV: CPT | Performed by: FAMILY MEDICINE

## 2024-05-23 PROCEDURE — G8417 CALC BMI ABV UP PARAM F/U: HCPCS | Performed by: FAMILY MEDICINE

## 2024-05-23 PROCEDURE — 2022F DILAT RTA XM EVC RTNOPTHY: CPT | Performed by: FAMILY MEDICINE

## 2024-05-23 PROCEDURE — G8427 DOCREV CUR MEDS BY ELIG CLIN: HCPCS | Performed by: FAMILY MEDICINE

## 2024-05-23 PROCEDURE — 3074F SYST BP LT 130 MM HG: CPT | Performed by: FAMILY MEDICINE

## 2024-05-23 PROCEDURE — 3051F HG A1C>EQUAL 7.0%<8.0%: CPT | Performed by: FAMILY MEDICINE

## 2024-05-23 PROCEDURE — 83036 HEMOGLOBIN GLYCOSYLATED A1C: CPT | Performed by: FAMILY MEDICINE

## 2024-05-23 PROCEDURE — 3078F DIAST BP <80 MM HG: CPT | Performed by: FAMILY MEDICINE

## 2024-05-23 PROCEDURE — 1036F TOBACCO NON-USER: CPT | Performed by: FAMILY MEDICINE

## 2024-05-23 SDOH — ECONOMIC STABILITY: FOOD INSECURITY: WITHIN THE PAST 12 MONTHS, YOU WORRIED THAT YOUR FOOD WOULD RUN OUT BEFORE YOU GOT MONEY TO BUY MORE.: NEVER TRUE

## 2024-05-23 SDOH — ECONOMIC STABILITY: FOOD INSECURITY: WITHIN THE PAST 12 MONTHS, THE FOOD YOU BOUGHT JUST DIDN'T LAST AND YOU DIDN'T HAVE MONEY TO GET MORE.: NEVER TRUE

## 2024-05-23 SDOH — ECONOMIC STABILITY: INCOME INSECURITY: HOW HARD IS IT FOR YOU TO PAY FOR THE VERY BASICS LIKE FOOD, HOUSING, MEDICAL CARE, AND HEATING?: NOT VERY HARD

## 2024-05-23 NOTE — PROGRESS NOTES
2024    Blood pressure 106/70, pulse 80, resp. rate 12, height 1.651 m (5' 5\"), weight 122.5 kg (270 lb), last menstrual period 2021, SpO2 93 %, not currently breastfeeding.    Noelle Alvarez (:  1970) is a 54 y.o. female, here for evaluation of the following medical concerns:    Chief Complaint   Patient presents with    Diabetes    Rash     Rash on chest is back and would like to take topical     Here wtih mom for f/u    DM-2: no complications.  Toe neuropathy (hands also) started after chemo for bladder cancer.    On metformin 1000, amaryl 4, Mounjaro 10.  Was off Mounjaro due to availability issues for 3-4 wks (last  month).  Weight increased while she was off Mounjaro - 20 lbs.  She binge eats and this is held in check by the GLP-1 treatment when she was using it.  Says stress can 'overpower the Mounjaro to cause stress eating.    Rarely drinks pop. Still snacks on high carb snacks, but has reduced about 50% she feels.  'still too much fast food' due to work stress.    Limited in exercise by foot numbness and fatigue.  Doing chair aerobics.     Lab Results   Component Value Date/Time    LABA1C 7.6 2024 10:37 AM    LABA1C 6.7 2024 10:45 AM    LABA1C 6.9 10/13/2023 01:11 PM      HTN: on toprol only.  No hx renal disease  Last renal function test:   Lab Results   Component Value Date/Time     10/20/2023 11:36 AM    K 4.0 10/20/2023 11:36 AM    K 4.1 10/14/2023 11:47 AM     10/20/2023 11:36 AM    CO2 27 10/20/2023 11:36 AM    BUN 29 10/20/2023 11:36 AM    CREATININE 0.9 10/20/2023 11:36 AM    GLUCOSE 162 10/20/2023 11:36 AM    CALCIUM 8.2 10/20/2023 11:36 AM    LABGLOM >60 10/20/2023 11:36 AM        Using Crestor 20 for lipids.  Last lipid test:  Lab Results   Component Value Date    CHOL 111 2023    TRIG 89 2023    HDL 28 (L) 2023     Lab Results   Component Value Date    ALT 7 (L) 10/13/2023    AST 7 (L) 10/13/2023     No chest pains, dizziness,

## 2024-05-24 ENCOUNTER — TELEPHONE (OUTPATIENT)
Dept: FAMILY MEDICINE CLINIC | Age: 54
End: 2024-05-24

## 2024-05-24 NOTE — TELEPHONE ENCOUNTER
Since the lab was drawn at the [facility] draw site, our core lab should reach out to them to instruct on specimen handling. There has to be some kind of instructions in Epic for how to handle the folate sample.  Mistakes like this should not happen.      I think this should be the core lab's responsibility at this point. Please Juliette or the  know.

## 2024-05-24 NOTE — TELEPHONE ENCOUNTER
Juliette good from [facility], Stated that the folate specimen was received at room temp and was is not able to run the lab.    Lab can be reached at [phone]

## 2024-05-30 LAB — FOLATE SERPL-MCNC: 12.17 NG/ML (ref 4.78–24.2)

## 2024-06-06 ENCOUNTER — OFFICE VISIT (OUTPATIENT)
Dept: SLEEP MEDICINE | Age: 54
End: 2024-06-06
Payer: COMMERCIAL

## 2024-06-06 VITALS
HEART RATE: 71 BPM | WEIGHT: 272.8 LBS | RESPIRATION RATE: 18 BRPM | HEIGHT: 65 IN | BODY MASS INDEX: 45.45 KG/M2 | DIASTOLIC BLOOD PRESSURE: 64 MMHG | OXYGEN SATURATION: 95 % | TEMPERATURE: 96.8 F | SYSTOLIC BLOOD PRESSURE: 114 MMHG

## 2024-06-06 DIAGNOSIS — G47.19 EXCESSIVE DAYTIME SLEEPINESS: ICD-10-CM

## 2024-06-06 DIAGNOSIS — E66.01 OBESITY, MORBID, BMI 40.0-49.9 (HCC): ICD-10-CM

## 2024-06-06 DIAGNOSIS — G47.33 OSA (OBSTRUCTIVE SLEEP APNEA): Primary | ICD-10-CM

## 2024-06-06 DIAGNOSIS — R06.83 SNORING: ICD-10-CM

## 2024-06-06 DIAGNOSIS — R06.89 GASPING FOR BREATH: ICD-10-CM

## 2024-06-06 DIAGNOSIS — I10 PRIMARY HYPERTENSION: ICD-10-CM

## 2024-06-06 PROCEDURE — 99204 OFFICE O/P NEW MOD 45 MIN: CPT | Performed by: PSYCHIATRY & NEUROLOGY

## 2024-06-06 PROCEDURE — 3017F COLORECTAL CA SCREEN DOC REV: CPT | Performed by: PSYCHIATRY & NEUROLOGY

## 2024-06-06 PROCEDURE — G8427 DOCREV CUR MEDS BY ELIG CLIN: HCPCS | Performed by: PSYCHIATRY & NEUROLOGY

## 2024-06-06 PROCEDURE — 3074F SYST BP LT 130 MM HG: CPT | Performed by: PSYCHIATRY & NEUROLOGY

## 2024-06-06 PROCEDURE — 3078F DIAST BP <80 MM HG: CPT | Performed by: PSYCHIATRY & NEUROLOGY

## 2024-06-06 PROCEDURE — 1036F TOBACCO NON-USER: CPT | Performed by: PSYCHIATRY & NEUROLOGY

## 2024-06-06 PROCEDURE — G8417 CALC BMI ABV UP PARAM F/U: HCPCS | Performed by: PSYCHIATRY & NEUROLOGY

## 2024-06-06 RX ORDER — VIBEGRON 75 MG/1
75 TABLET, FILM COATED ORAL DAILY
COMMUNITY

## 2024-06-06 ASSESSMENT — ENCOUNTER SYMPTOMS
EYES NEGATIVE: 1
CHOKING: 1
GASTROINTESTINAL NEGATIVE: 1
SHORTNESS OF BREATH: 1

## 2024-06-06 ASSESSMENT — SLEEP AND FATIGUE QUESTIONNAIRES
HOW LIKELY ARE YOU TO NOD OFF OR FALL ASLEEP WHILE SITTING AND READING: HIGH CHANCE OF DOZING
HOW LIKELY ARE YOU TO NOD OFF OR FALL ASLEEP WHEN YOU ARE A PASSENGER IN A CAR FOR AN HOUR WITHOUT A BREAK: HIGH CHANCE OF DOZING
HOW LIKELY ARE YOU TO NOD OFF OR FALL ASLEEP WHILE WATCHING TV: MODERATE CHANCE OF DOZING
HOW LIKELY ARE YOU TO NOD OFF OR FALL ASLEEP WHILE SITTING AND TALKING TO SOMEONE: WOULD NEVER DOZE
HOW LIKELY ARE YOU TO NOD OFF OR FALL ASLEEP IN A CAR, WHILE STOPPED FOR A FEW MINUTES IN TRAFFIC: WOULD NEVER DOZE
ESS TOTAL SCORE: 11
NECK CIRCUMFERENCE (INCHES): 15
HOW LIKELY ARE YOU TO NOD OFF OR FALL ASLEEP WHILE SITTING QUIETLY AFTER LUNCH WITHOUT ALCOHOL: SLIGHT CHANCE OF DOZING
HOW LIKELY ARE YOU TO NOD OFF OR FALL ASLEEP WHILE SITTING INACTIVE IN A PUBLIC PLACE: WOULD NEVER DOZE
HOW LIKELY ARE YOU TO NOD OFF OR FALL ASLEEP WHILE LYING DOWN TO REST IN THE AFTERNOON WHEN CIRCUMSTANCES PERMIT: MODERATE CHANCE OF DOZING

## 2024-06-06 NOTE — PROGRESS NOTES
MD SAI Hein Board Certified in Sleep Medicine  [facility]  Board Certified in Neurology [facility]  [address]   [address]  [place]  [address]  P- [phone]   [facility]   [address]  [address]   [place], [place] [address]           [facility]  [address] [address] [phone]    Subjective:     Patient ID: Noelle Alvarez is a 54 y.o. female.    Chief Complaint   Patient presents with    New Patient    Snoring    Fatigue    Daytime Sleepiness       HPI:        Noelle Alvarez is a 54 y.o. female referred by Dr. Jackson for a sleep evaluation. She complains of snoring, snorting, choking, tossing and turning, excessive daytime sleepiness, feels sleepy during the day, take naps during the day but she denies periods of not breathing, knees buckling with laughing, completely or partially paralyzed while falling asleep or waking up.  Symptoms began several years ago, gradually worsening since that time.   The patient has no bed-partner.  SLEEP SCHEDULE: Goes to bed around 12-2 AM in the weekdays and 12-2 AM in the weekends. It usually takes the patient  minutes to fall asleep. The patient gets up 3-4 per night to go to the bathroom. The Patient finally gets up at 8-10 AM during the weekdays and 8-10 AM in the weekends. patient wakes up with dry mouth and sometimes morning headache.. the headache usually sharp to dull headache.   The patient has restless sleep with frequent arousals in addition to the Patient has significant daytime sleepiness. The Patient scored [place] Sleepiness Score: 11 on [place] Sleepiness Scale ( more than 10 is indicative of daytime sleepiness)and 50 in fatigue scale ( more than 36 is indicative of daytime fatigue). The patient takes  3-5 naps/week for 30 minutes and usually is not

## 2024-06-06 NOTE — PATIENT INSTRUCTIONS
Orders Placed This Encounter   Procedures    Baseline Diagnostic Sleep Study     Standing Status:   Future     Standing Expiration Date:   6/6/2025     Order Specific Question:   Adult or Pediatric     Answer:   Adult Study (>7 Years)     Order Specific Question:   Location For Sleep Study     Answer:   [place]     Order Specific Question:   Select Sleep Lab Location     Answer:   [facility]    Sleep Study with PAP Titration     Standing Status:   Future     Standing Expiration Date:   6/6/2025     Order Specific Question:   Sleep Study Titration Type     Answer:   CPAP     Order Specific Question:   Location For Sleep Study     Answer:   [place]     Order Specific Question:   Select Sleep Lab Location     Answer:   [facility]

## 2024-07-29 ENCOUNTER — HOSPITAL ENCOUNTER (OUTPATIENT)
Dept: SLEEP CENTER | Age: 54
Discharge: HOME | End: 2024-07-29
Attending: PSYCHIATRY & NEUROLOGY
Payer: COMMERCIAL

## 2024-07-29 DIAGNOSIS — G47.33 OSA (OBSTRUCTIVE SLEEP APNEA): ICD-10-CM

## 2024-07-29 PROCEDURE — 95810 POLYSOM 6/> YRS 4/> PARAM: CPT

## 2024-07-30 PROBLEM — G47.33 OSA (OBSTRUCTIVE SLEEP APNEA): Status: ACTIVE | Noted: 2024-07-30

## 2024-07-30 PROCEDURE — 95810 POLYSOM 6/> YRS 4/> PARAM: CPT | Performed by: PSYCHIATRY & NEUROLOGY

## 2024-07-31 ENCOUNTER — TELEPHONE (OUTPATIENT)
Dept: PULMONOLOGY | Age: 54
End: 2024-07-31

## 2024-07-31 NOTE — TELEPHONE ENCOUNTER
psg Sleep study showed severe GABRIELA (on a scale of mild, moderate and severe).  AHI was 41.3  per hr. (Average times per hr breathing was obstructed).  O2 Desaturations to 64% (lowest o2)    Recommends:    Follow up with the patient's sleep physician to discuss results      Avoid sedatives, alcohol and caffeinated drinks at bedtime.    Recommend:  Titration     Sleep Lab used: [facility]    Avoid driving while sleepy.       The patient has been notified of this information and all questions answered.  Transferred to sleep lab

## 2024-08-09 LAB — NONINV COLON CA DNA+OCC BLD SCRN STL QL: POSITIVE

## 2024-08-12 DIAGNOSIS — R19.5 POSITIVE COLORECTAL CANCER SCREENING USING COLOGUARD TEST: Primary | ICD-10-CM

## 2024-08-14 ENCOUNTER — TELEPHONE (OUTPATIENT)
Dept: FAMILY MEDICINE CLINIC | Age: 54
End: 2024-08-14

## 2024-08-14 DIAGNOSIS — Z12.31 ENCOUNTER FOR SCREENING MAMMOGRAM FOR BREAST CANCER: Primary | ICD-10-CM

## 2024-08-26 DIAGNOSIS — E11.9 TYPE 2 DIABETES MELLITUS WITHOUT COMPLICATION, WITHOUT LONG-TERM CURRENT USE OF INSULIN (HCC): ICD-10-CM

## 2024-08-26 RX ORDER — TIRZEPATIDE 10 MG/.5ML
INJECTION, SOLUTION SUBCUTANEOUS
Qty: 2 ML | Refills: 12 | Status: SHIPPED | OUTPATIENT
Start: 2024-08-26

## 2024-08-28 ENCOUNTER — HOSPITAL ENCOUNTER (OUTPATIENT)
Dept: SLEEP CENTER | Age: 54
Discharge: HOME | End: 2024-08-28
Payer: COMMERCIAL

## 2024-08-28 DIAGNOSIS — G47.33 OSA (OBSTRUCTIVE SLEEP APNEA): ICD-10-CM

## 2024-08-28 PROCEDURE — 95811 POLYSOM 6/>YRS CPAP 4/> PARM: CPT

## 2024-08-28 RX ORDER — FEXOFENADINE HCL 180 MG/1
180 TABLET ORAL DAILY
COMMUNITY

## 2024-08-29 PROBLEM — G47.39 TREATMENT-EMERGENT CENTRAL SLEEP APNEA: Status: ACTIVE | Noted: 2024-08-29

## 2024-08-29 NOTE — PROGRESS NOTES
Noelle Alvarez         : 1970  [] [facility]     []  HealthCare      [] [facility]     [facility]    [] [facility]  [] [facility]  [] [facility]   [] [facility] services [] Other:  Diagnosis: [x] GABRIELA (G47.33) [] CSA (G47.31) [] Apnea (G47.30)   Length of Need: [] 12 Months [x] 99 Months [] Other:    Machine (LISS!):  [x] ResMed AirSense     Auto [] Other:     [x]  CPAP () [] Bilevel ()   Mode: [x] Auto [] Spontaneous    Mode: [] Auto [] Spontaneous           Between 8 and 14 cm                 Comfort Settings:     If the order for CPAP  - Ramp Pressure: 5 cmH2O                                        - Ramp time: 15 min                                     -  Flex/EPR - 3 full time       Humidifier: [x] Heated ()        [x] Water chamber replacement ()/ 1 per 6 months        Mask:  Please always start with the mask the patient used during the titraion   [x] Nasal () /1 per 3 months    [x] Patient choice -Size and fit mask    [] Dispense: small Airfit P10 nasal pillows     [x] Headgear () / 1 per 6 months        [x] Replacement Nasal Pillows ()/2 per month         Tubing: [x] Heated ()/1 per 3 months    [] Standard ()/1 per 3 months [] Other:           Filters: [x] Non-disposable ()/1 per 6 months     [x] Ultra-Fine, Disposable ()/2 per month        Miscellaneous: [x] Chin Strap ()/ 1 per 6 months [] O2 bleed-in:       LPM   [] Oximetry on CPAP/Bilevel []  Other:          Start Order Date: 24    MEDICAL JUSTIFICATION:  I, the undersigned, certify that the above prescribed supplies are medically necessary for this patient’s wellbeing.  In my opinion, the supplies are both reasonable and necessary in reference to accepted standards of medicalpractice in treatment of this patient’s condition.    Mago Cervantes MD      NPI: [id number]       Order Signed Date: 24    Electronically signed by Mago Cervantes MD on 2024 at 10:47 AM

## 2024-09-04 ENCOUNTER — TELEPHONE (OUTPATIENT)
Dept: PULMONOLOGY | Age: 54
End: 2024-09-04

## 2024-09-04 NOTE — TELEPHONE ENCOUNTER
Titration study shows adequate control of the events at a CPAP pressure of 8/14 cm.      [email or web address]    Patient will need appointment 31-90 days after starting new machine    The patient has been notified of this information and all questions answered.

## 2024-09-06 ENCOUNTER — HOSPITAL ENCOUNTER (OUTPATIENT)
Dept: MAMMOGRAPHY | Age: 54
Discharge: HOME | End: 2024-09-11
Payer: COMMERCIAL

## 2024-09-06 VITALS — WEIGHT: 275 LBS | BODY MASS INDEX: 45.82 KG/M2 | HEIGHT: 65 IN

## 2024-09-06 DIAGNOSIS — Z12.31 VISIT FOR SCREENING MAMMOGRAM: ICD-10-CM

## 2024-09-06 PROCEDURE — 77063 BREAST TOMOSYNTHESIS BI: CPT

## 2024-09-09 ENCOUNTER — ANESTHESIA EVENT (OUTPATIENT)
Dept: ENDOSCOPY | Age: 54
End: 2024-09-09
Payer: COMMERCIAL

## 2024-09-11 ENCOUNTER — HOSPITAL ENCOUNTER (OUTPATIENT)
Age: 54
Setting detail: OUTPATIENT SURGERY
Discharge: HOME | End: 2024-09-11
Attending: INTERNAL MEDICINE | Admitting: INTERNAL MEDICINE
Payer: COMMERCIAL

## 2024-09-11 ENCOUNTER — ANESTHESIA (OUTPATIENT)
Dept: ENDOSCOPY | Age: 54
End: 2024-09-11
Payer: COMMERCIAL

## 2024-09-11 VITALS
WEIGHT: 277.34 LBS | BODY MASS INDEX: 46.21 KG/M2 | TEMPERATURE: 97.1 F | RESPIRATION RATE: 12 BRPM | OXYGEN SATURATION: 97 % | SYSTOLIC BLOOD PRESSURE: 141 MMHG | HEART RATE: 66 BPM | HEIGHT: 65 IN | DIASTOLIC BLOOD PRESSURE: 82 MMHG

## 2024-09-11 DIAGNOSIS — R19.5 POSITIVE COLORECTAL CANCER SCREENING USING DNA-BASED STOOL TEST: ICD-10-CM

## 2024-09-11 LAB
GLUCOSE BLD-MCNC: 143 MG/DL (ref 70–99)
GLUCOSE BLD-MCNC: 152 MG/DL (ref 70–99)
PERFORMED ON: ABNORMAL
PERFORMED ON: ABNORMAL

## 2024-09-11 PROCEDURE — 88305 TISSUE EXAM BY PATHOLOGIST: CPT

## 2024-09-11 PROCEDURE — 3609010600 HC COLONOSCOPY POLYPECTOMY SNARE/COLD BIOPSY: Performed by: INTERNAL MEDICINE

## 2024-09-11 PROCEDURE — 2580000003 HC RX 258: Performed by: STUDENT IN AN ORGANIZED HEALTH CARE EDUCATION/TRAINING PROGRAM

## 2024-09-11 PROCEDURE — 7100000001 HC PACU RECOVERY - ADDTL 15 MIN: Performed by: INTERNAL MEDICINE

## 2024-09-11 PROCEDURE — 2500000003 HC RX 250 WO HCPCS: Performed by: STUDENT IN AN ORGANIZED HEALTH CARE EDUCATION/TRAINING PROGRAM

## 2024-09-11 PROCEDURE — 6360000002 HC RX W HCPCS

## 2024-09-11 PROCEDURE — 3700000000 HC ANESTHESIA ATTENDED CARE: Performed by: INTERNAL MEDICINE

## 2024-09-11 PROCEDURE — 2709999900 HC NON-CHARGEABLE SUPPLY: Performed by: INTERNAL MEDICINE

## 2024-09-11 PROCEDURE — 7100000010 HC PHASE II RECOVERY - FIRST 15 MIN: Performed by: INTERNAL MEDICINE

## 2024-09-11 PROCEDURE — 3700000001 HC ADD 15 MINUTES (ANESTHESIA): Performed by: INTERNAL MEDICINE

## 2024-09-11 PROCEDURE — 2500000003 HC RX 250 WO HCPCS

## 2024-09-11 PROCEDURE — 7100000011 HC PHASE II RECOVERY - ADDTL 15 MIN: Performed by: INTERNAL MEDICINE

## 2024-09-11 PROCEDURE — 6360000002 HC RX W HCPCS: Performed by: STUDENT IN AN ORGANIZED HEALTH CARE EDUCATION/TRAINING PROGRAM

## 2024-09-11 PROCEDURE — 7100000000 HC PACU RECOVERY - FIRST 15 MIN: Performed by: INTERNAL MEDICINE

## 2024-09-11 RX ORDER — SODIUM CHLORIDE 9 MG/ML
INJECTION, SOLUTION INTRAVENOUS PRN
Status: DISCONTINUED | OUTPATIENT
Start: 2024-09-11 | End: 2024-09-11 | Stop reason: HOSPADM

## 2024-09-11 RX ORDER — IPRATROPIUM BROMIDE AND ALBUTEROL SULFATE 2.5; .5 MG/3ML; MG/3ML
1 SOLUTION RESPIRATORY (INHALATION)
Status: DISCONTINUED | OUTPATIENT
Start: 2024-09-11 | End: 2024-09-11 | Stop reason: HOSPADM

## 2024-09-11 RX ORDER — ONDANSETRON 2 MG/ML
4 INJECTION INTRAMUSCULAR; INTRAVENOUS
Status: COMPLETED | OUTPATIENT
Start: 2024-09-11 | End: 2024-09-11

## 2024-09-11 RX ORDER — SODIUM CHLORIDE 0.9 % (FLUSH) 0.9 %
5-40 SYRINGE (ML) INJECTION PRN
Status: DISCONTINUED | OUTPATIENT
Start: 2024-09-11 | End: 2024-09-11 | Stop reason: HOSPADM

## 2024-09-11 RX ORDER — LIDOCAINE HYDROCHLORIDE 20 MG/ML
INJECTION, SOLUTION EPIDURAL; INFILTRATION; INTRACAUDAL; PERINEURAL PRN
Status: DISCONTINUED | OUTPATIENT
Start: 2024-09-11 | End: 2024-09-11 | Stop reason: SDUPTHER

## 2024-09-11 RX ORDER — NALOXONE HYDROCHLORIDE 0.4 MG/ML
INJECTION, SOLUTION INTRAMUSCULAR; INTRAVENOUS; SUBCUTANEOUS PRN
Status: DISCONTINUED | OUTPATIENT
Start: 2024-09-11 | End: 2024-09-11 | Stop reason: HOSPADM

## 2024-09-11 RX ORDER — SODIUM CHLORIDE 0.9 % (FLUSH) 0.9 %
5-40 SYRINGE (ML) INJECTION EVERY 12 HOURS SCHEDULED
Status: DISCONTINUED | OUTPATIENT
Start: 2024-09-11 | End: 2024-09-11 | Stop reason: HOSPADM

## 2024-09-11 RX ORDER — ONDANSETRON 2 MG/ML
4 INJECTION INTRAMUSCULAR; INTRAVENOUS
Status: DISCONTINUED | OUTPATIENT
Start: 2024-09-11 | End: 2024-09-11 | Stop reason: HOSPADM

## 2024-09-11 RX ORDER — PROPOFOL 10 MG/ML
INJECTION, EMULSION INTRAVENOUS PRN
Status: DISCONTINUED | OUTPATIENT
Start: 2024-09-11 | End: 2024-09-11 | Stop reason: SDUPTHER

## 2024-09-11 RX ADMIN — FAMOTIDINE 20 MG: 10 INJECTION, SOLUTION INTRAVENOUS at 08:46

## 2024-09-11 RX ADMIN — ONDANSETRON 4 MG: 2 INJECTION INTRAMUSCULAR; INTRAVENOUS at 08:46

## 2024-09-11 RX ADMIN — SODIUM CHLORIDE: 9 INJECTION, SOLUTION INTRAVENOUS at 09:48

## 2024-09-11 RX ADMIN — LIDOCAINE HYDROCHLORIDE 100 MG: 20 INJECTION, SOLUTION EPIDURAL; INFILTRATION; INTRACAUDAL; PERINEURAL at 10:04

## 2024-09-11 RX ADMIN — PROPOFOL 80 MG: 10 INJECTION, EMULSION INTRAVENOUS at 10:04

## 2024-09-11 RX ADMIN — PROPOFOL 180 MCG/KG/MIN: 10 INJECTION, EMULSION INTRAVENOUS at 10:05

## 2024-09-11 ASSESSMENT — PAIN - FUNCTIONAL ASSESSMENT
PAIN_FUNCTIONAL_ASSESSMENT: NONE - DENIES PAIN
PAIN_FUNCTIONAL_ASSESSMENT: NONE - DENIES PAIN

## 2024-09-11 ASSESSMENT — LIFESTYLE VARIABLES: SMOKING_STATUS: 0

## 2024-09-11 ASSESSMENT — PAIN SCALES - GENERAL: PAINLEVEL_OUTOF10: 0

## 2024-09-11 ASSESSMENT — ENCOUNTER SYMPTOMS: SHORTNESS OF BREATH: 0

## 2024-09-12 ENCOUNTER — NURSE ONLY (OUTPATIENT)
Dept: FAMILY MEDICINE CLINIC | Age: 54
End: 2024-09-12

## 2024-11-06 RX ORDER — METOPROLOL SUCCINATE 50 MG/1
50 TABLET, EXTENDED RELEASE ORAL DAILY
Qty: 90 TABLET | Refills: 1 | Status: SHIPPED | OUTPATIENT
Start: 2024-11-06

## 2024-11-06 RX ORDER — GLIMEPIRIDE 4 MG/1
4 TABLET ORAL EVERY MORNING
Qty: 90 TABLET | Refills: 1 | Status: SHIPPED | OUTPATIENT
Start: 2024-11-06

## 2024-11-06 RX ORDER — ROSUVASTATIN CALCIUM 20 MG/1
20 TABLET, COATED ORAL DAILY
Qty: 90 TABLET | Refills: 1 | Status: SHIPPED | OUTPATIENT
Start: 2024-11-06

## 2024-11-21 ENCOUNTER — OFFICE VISIT (OUTPATIENT)
Dept: SLEEP MEDICINE | Age: 54
End: 2024-11-21
Payer: COMMERCIAL

## 2024-11-21 ENCOUNTER — OFFICE VISIT (OUTPATIENT)
Dept: FAMILY MEDICINE CLINIC | Age: 54
End: 2024-11-21

## 2024-11-21 VITALS
HEART RATE: 75 BPM | RESPIRATION RATE: 18 BRPM | DIASTOLIC BLOOD PRESSURE: 70 MMHG | WEIGHT: 282.4 LBS | SYSTOLIC BLOOD PRESSURE: 110 MMHG | BODY MASS INDEX: 47.05 KG/M2 | HEIGHT: 65 IN | OXYGEN SATURATION: 98 %

## 2024-11-21 VITALS
DIASTOLIC BLOOD PRESSURE: 70 MMHG | SYSTOLIC BLOOD PRESSURE: 110 MMHG | RESPIRATION RATE: 18 BRPM | WEIGHT: 282.2 LBS | HEART RATE: 75 BPM | OXYGEN SATURATION: 98 % | BODY MASS INDEX: 46.96 KG/M2

## 2024-11-21 DIAGNOSIS — Z99.89 DEPENDENCE ON OTHER ENABLING MACHINES AND DEVICES: ICD-10-CM

## 2024-11-21 DIAGNOSIS — E66.01 OBESITY, MORBID, BMI 40.0-49.9: ICD-10-CM

## 2024-11-21 DIAGNOSIS — E66.813 CLASS 3 SEVERE OBESITY DUE TO EXCESS CALORIES WITH SERIOUS COMORBIDITY AND BODY MASS INDEX (BMI) OF 45.0 TO 49.9 IN ADULT: ICD-10-CM

## 2024-11-21 DIAGNOSIS — E11.9 TYPE 2 DIABETES MELLITUS WITHOUT COMPLICATION, WITHOUT LONG-TERM CURRENT USE OF INSULIN (HCC): Primary | ICD-10-CM

## 2024-11-21 DIAGNOSIS — G47.33 OSA ON CPAP: Primary | ICD-10-CM

## 2024-11-21 DIAGNOSIS — I10 PRIMARY HYPERTENSION: ICD-10-CM

## 2024-11-21 DIAGNOSIS — E66.01 CLASS 3 SEVERE OBESITY DUE TO EXCESS CALORIES WITH SERIOUS COMORBIDITY AND BODY MASS INDEX (BMI) OF 45.0 TO 49.9 IN ADULT: ICD-10-CM

## 2024-11-21 LAB — HBA1C MFR BLD: 7.1 %

## 2024-11-21 PROCEDURE — 3078F DIAST BP <80 MM HG: CPT | Performed by: PSYCHIATRY & NEUROLOGY

## 2024-11-21 PROCEDURE — 1036F TOBACCO NON-USER: CPT | Performed by: PSYCHIATRY & NEUROLOGY

## 2024-11-21 PROCEDURE — G8427 DOCREV CUR MEDS BY ELIG CLIN: HCPCS | Performed by: PSYCHIATRY & NEUROLOGY

## 2024-11-21 PROCEDURE — 3074F SYST BP LT 130 MM HG: CPT | Performed by: PSYCHIATRY & NEUROLOGY

## 2024-11-21 PROCEDURE — G8417 CALC BMI ABV UP PARAM F/U: HCPCS | Performed by: PSYCHIATRY & NEUROLOGY

## 2024-11-21 PROCEDURE — 99214 OFFICE O/P EST MOD 30 MIN: CPT | Performed by: PSYCHIATRY & NEUROLOGY

## 2024-11-21 PROCEDURE — G8484 FLU IMMUNIZE NO ADMIN: HCPCS | Performed by: PSYCHIATRY & NEUROLOGY

## 2024-11-21 PROCEDURE — 3017F COLORECTAL CA SCREEN DOC REV: CPT | Performed by: PSYCHIATRY & NEUROLOGY

## 2024-11-21 ASSESSMENT — SLEEP AND FATIGUE QUESTIONNAIRES
ESS TOTAL SCORE: 7
HOW LIKELY ARE YOU TO NOD OFF OR FALL ASLEEP WHILE WATCHING TV: MODERATE CHANCE OF DOZING
HOW LIKELY ARE YOU TO NOD OFF OR FALL ASLEEP WHILE SITTING AND TALKING TO SOMEONE: WOULD NEVER DOZE
HOW LIKELY ARE YOU TO NOD OFF OR FALL ASLEEP WHILE SITTING AND READING: SLIGHT CHANCE OF DOZING
HOW LIKELY ARE YOU TO NOD OFF OR FALL ASLEEP WHILE LYING DOWN TO REST IN THE AFTERNOON WHEN CIRCUMSTANCES PERMIT: MODERATE CHANCE OF DOZING
HOW LIKELY ARE YOU TO NOD OFF OR FALL ASLEEP IN A CAR, WHILE STOPPED FOR A FEW MINUTES IN TRAFFIC: WOULD NEVER DOZE
HOW LIKELY ARE YOU TO NOD OFF OR FALL ASLEEP WHILE SITTING INACTIVE IN A PUBLIC PLACE: WOULD NEVER DOZE
HOW LIKELY ARE YOU TO NOD OFF OR FALL ASLEEP WHEN YOU ARE A PASSENGER IN A CAR FOR AN HOUR WITHOUT A BREAK: MODERATE CHANCE OF DOZING
HOW LIKELY ARE YOU TO NOD OFF OR FALL ASLEEP WHILE SITTING QUIETLY AFTER LUNCH WITHOUT ALCOHOL: WOULD NEVER DOZE

## 2024-11-21 NOTE — PROGRESS NOTES
obesity due to excess calories with serious comorbidity and body mass index (BMI) of 45.0 to 49.9 in adult            Assessment/Plan:   1. Severe Obstructive sleep apnea syndrome syndrome   Assessment & Plan:  Chronic, stable, on PAP at 10.5 cmwp, I will continue the PAP at 8-14 cmwp.  I Encouraged the patient to use the machine on nightly basis, at least 4 hours a night.  I instructed the patient to adjust the humidifier as needed for dry mouth.    Reviewed and analyzed results of physiologic download from patient's machine and reviewed with patient.  Supplies and parts as needed for her machine.  These are medically necessary.        2. Morbid obesity:  Assessment & Plan:  Chronic-not stable:    The proportionality between weight and AHI.  With 10% weight change, the AHI has a 27% proportionate change.  With 20% weight change, the AHI has a 45-50% proportionate change.           No orders of the defined types were placed in this encounter.      Return in about 1 year (around 11/21/2025) for Reveiwing CPAP usage and compliance report and tro.    Mago Cervantes MD  Medical Director - [facility]

## 2024-11-21 NOTE — PROGRESS NOTES
2024    Blood pressure 110/70, pulse 75, resp. rate 18, weight 128 kg (282 lb 3.2 oz), last menstrual period 2021, SpO2 98%, not currently breastfeeding.    Noelle Alvarez (:  1970) is a 54 y.o. female, here for evaluation of the following medical concerns:    Chief Complaint   Patient presents with    Follow-up    Diabetes     Doing good. No new concerns.     F/u for diabetes, here with mom  DM- no complications  -Currently taking metformin 1000, glimepiride 4, and Mounjaro 10 (having to stop mounjaro for procedures)  -Denies any side effects  -A1C this visit 7.1  -Reports she is doing well, doing some light exercise/stretching. Working on diet.   -Rarely checking blood sugars at home.   Hemoglobin A1C   Date Value Ref Range Status   2024 7.6 % Final     Last microalbumin was elevated. Needs recheck today.    Microalb/Creat Ratio (mg/g)   Date Value   2024 95.6 (H)   2023 see below   2022 16.2          -Checking blood pressure at home with her mother, occasionally will notice high bp, busy time of year  -Works at [employer]. Working extra.   -Taking bp at home  -on metoprolol 50mg    Patient Active Problem List   Diagnosis    Allergic rhinitis    Vitamin D deficiency    Type 2 diabetes mellitus without complication (HCC) dx 8/15.    Obesity, morbid, BMI 40.0-49.9    Pure hypercholesterolemia    Oropharyngeal dysphagia    Primary hypertension    Anxiety in acute stress reaction    Malignant neoplasm of ureteric orifice (HCC) dx     Bilateral arm numbness and tingling while sleeping    Hearing loss    Drug-induced peripheral neuropathy (HCC)    GABRIELA (obstructive sleep apnea)    Treatment-emergent central sleep apnea        Body mass index is 46.96 kg/m².    Wt Readings from Last 3 Encounters:   24 128 kg (282 lb 3.2 oz)   24 124.7 kg (275 lb)   24 125.8 kg (277 lb 5.4 oz)       BP Readings from Last 3 Encounters:   24 110/70

## 2024-11-22 DIAGNOSIS — E11.9 TYPE 2 DIABETES MELLITUS WITHOUT COMPLICATION, WITHOUT LONG-TERM CURRENT USE OF INSULIN (HCC): ICD-10-CM

## 2024-11-22 LAB
CREAT UR-MCNC: 157 MG/DL (ref 28–259)
MICROALBUMIN UR DL<=1MG/L-MCNC: <1.2 MG/DL
MICROALBUMIN/CREAT UR: NORMAL MG/G (ref 0–30)

## 2024-12-27 NOTE — FLOWSHEET NOTE
Pharmacy faxed in a request for prior authorization on:    Medication: Wegovy  Dosage: 0.25MG  Quantity requested:    Pharmacy for prescription has been selected.[facility]    Initiation of prior authorization needed.    Stretch into IR  15 min,   a. (not applied due to skin irritation)    Modalities:    US x 8 min at 1/5 W/cm2 to right biceps area, CP to right upper arm x 10 min  Timed Code Treatment Minutes:    55  Total Treatment Minutes:    65    Treatment/Activity Tolerance:  [x] Patient tolerated treatment well [] Patient limited by fatigue  [] Patient limited by pain  [] Patient limited by other medical complications  [] Other:     Prognosis: [x] Good [] Fair  [] Poor    Goals:    Short term goals  Time Frame for Short term goals: 4 weeks  Short term goal 1: Pt will increase right shoulder ROM to nearly WNL  Short term goal 2: Pt will increase Strength to 4+/5 on right shoulder  Short term goal 3: Pt will note decreased pain, indicating healing of injury   Short term goal 4: Pt will be independent in HEP so she can avoid future injury              Patient Requires Follow-up: [x] Yes  [] No    Plan:   [] Continue per plan of care [] Alter current plan (see comments)  [x] Plan of care initiated [] Hold pending MD visit [] Discharge    Plan for Next Session: Add exercises. Will continue 4 more visits.     Electronically signed by:  Taya Vela PTA,

## 2025-03-20 ENCOUNTER — TELEPHONE (OUTPATIENT)
Dept: FAMILY MEDICINE CLINIC | Age: 55
End: 2025-03-20

## 2025-03-20 NOTE — TELEPHONE ENCOUNTER
Pt needs a refill on her mounjaro 10mg/0.[facility]ml [facility]  It needs to be ordered as a 3 month supply as they will no longer deliver a 1 month supply

## 2025-04-24 ENCOUNTER — OFFICE VISIT (OUTPATIENT)
Dept: FAMILY MEDICINE CLINIC | Age: 55
End: 2025-04-24
Payer: COMMERCIAL

## 2025-04-24 VITALS
BODY MASS INDEX: 46.43 KG/M2 | RESPIRATION RATE: 14 BRPM | WEIGHT: 279 LBS | OXYGEN SATURATION: 98 % | DIASTOLIC BLOOD PRESSURE: 76 MMHG | SYSTOLIC BLOOD PRESSURE: 126 MMHG | HEART RATE: 84 BPM

## 2025-04-24 DIAGNOSIS — E66.01 OBESITY, MORBID, BMI 40.0-49.9 (HCC): ICD-10-CM

## 2025-04-24 DIAGNOSIS — I10 PRIMARY HYPERTENSION: ICD-10-CM

## 2025-04-24 DIAGNOSIS — E11.9 TYPE 2 DIABETES MELLITUS WITHOUT COMPLICATION, WITHOUT LONG-TERM CURRENT USE OF INSULIN (HCC): Primary | ICD-10-CM

## 2025-04-24 DIAGNOSIS — E78.00 PURE HYPERCHOLESTEROLEMIA: ICD-10-CM

## 2025-04-24 LAB — HBA1C MFR BLD: 8.2 %

## 2025-04-24 PROCEDURE — 99214 OFFICE O/P EST MOD 30 MIN: CPT | Performed by: FAMILY MEDICINE

## 2025-04-24 PROCEDURE — 3052F HG A1C>EQUAL 8.0%<EQUAL 9.0%: CPT | Performed by: FAMILY MEDICINE

## 2025-04-24 PROCEDURE — 2022F DILAT RTA XM EVC RTNOPTHY: CPT | Performed by: FAMILY MEDICINE

## 2025-04-24 PROCEDURE — G8417 CALC BMI ABV UP PARAM F/U: HCPCS | Performed by: FAMILY MEDICINE

## 2025-04-24 PROCEDURE — G8427 DOCREV CUR MEDS BY ELIG CLIN: HCPCS | Performed by: FAMILY MEDICINE

## 2025-04-24 PROCEDURE — 1036F TOBACCO NON-USER: CPT | Performed by: FAMILY MEDICINE

## 2025-04-24 PROCEDURE — 3017F COLORECTAL CA SCREEN DOC REV: CPT | Performed by: FAMILY MEDICINE

## 2025-04-24 PROCEDURE — 90677 PCV20 VACCINE IM: CPT | Performed by: FAMILY MEDICINE

## 2025-04-24 PROCEDURE — 83036 HEMOGLOBIN GLYCOSYLATED A1C: CPT | Performed by: FAMILY MEDICINE

## 2025-04-24 PROCEDURE — 3078F DIAST BP <80 MM HG: CPT | Performed by: FAMILY MEDICINE

## 2025-04-24 PROCEDURE — 90471 IMMUNIZATION ADMIN: CPT | Performed by: FAMILY MEDICINE

## 2025-04-24 PROCEDURE — 3074F SYST BP LT 130 MM HG: CPT | Performed by: FAMILY MEDICINE

## 2025-04-24 SDOH — ECONOMIC STABILITY: FOOD INSECURITY: WITHIN THE PAST 12 MONTHS, YOU WORRIED THAT YOUR FOOD WOULD RUN OUT BEFORE YOU GOT MONEY TO BUY MORE.: NEVER TRUE

## 2025-04-24 SDOH — ECONOMIC STABILITY: FOOD INSECURITY: WITHIN THE PAST 12 MONTHS, THE FOOD YOU BOUGHT JUST DIDN'T LAST AND YOU DIDN'T HAVE MONEY TO GET MORE.: NEVER TRUE

## 2025-04-24 ASSESSMENT — PATIENT HEALTH QUESTIONNAIRE - PHQ9
2. FEELING DOWN, DEPRESSED OR HOPELESS: NOT AT ALL
SUM OF ALL RESPONSES TO PHQ QUESTIONS 1-9: 0
1. LITTLE INTEREST OR PLEASURE IN DOING THINGS: NOT AT ALL
SUM OF ALL RESPONSES TO PHQ QUESTIONS 1-9: 0

## 2025-04-24 NOTE — PROGRESS NOTES
ASSESSMENT/PLAN:    Assessment & Plan  Type 2 diabetes mellitus without complication, without long-term current use of insulin ([facility])   - A1c 8.2 (not to goal).  Goal A1c <7.  Incr Mounjaro to 12.5.  plans to start walking daily again also.  Continue metformin/amaryl.  Recheck 3 mo  Foot exam normal.    Orders:    POCT glycosylated hemoglobin (Hb A1C)    HM DIABETES FOOT EXAM    Obesity, morbid, BMI 40.0-49.9 ([facility])   - offered bariatric referral.  She feels she can achieve wt loss with mounjaro and exercise.           Pure hypercholesterolemia   - Stable; continue Crestor 20; recheck FLP     Orders:    Lipid Panel; Future    Primary hypertension   - Blood pressure is at goal on current therapy; continue meds and monitoring. Regular physical activity and healthy diet (low sodium, multiple servings of produce daily) was recommended.  Renal function to be assessed yearly.     Orders:    Comprehensive Metabolic Panel; Future      F/u 3 mo    An  Soldsieignature was used to authenticate this note.    --Vasile Jackson MD on 4/24/2025 at 11:42 AM

## 2025-04-24 NOTE — ASSESSMENT & PLAN NOTE
- offered bariatric referral.  She feels she can achieve wt loss with mounjaro and exercise.

## 2025-04-24 NOTE — ASSESSMENT & PLAN NOTE
- A1c 8.2 (not to goal).  Goal A1c <7.  Incr Mounjaro to 12.5.  plans to start walking daily again also.  Continue metformin/amaryl.  Recheck 3 mo  Foot exam normal.    Orders:    POCT glycosylated hemoglobin (Hb A1C)     DIABETES FOOT EXAM

## 2025-04-25 DIAGNOSIS — E78.00 PURE HYPERCHOLESTEROLEMIA: ICD-10-CM

## 2025-04-25 DIAGNOSIS — I10 PRIMARY HYPERTENSION: ICD-10-CM

## 2025-04-25 LAB
ALBUMIN SERPL-MCNC: 3.8 G/DL (ref 3.4–5)
ALBUMIN/GLOB SERPL: 1.6 {RATIO} (ref 1.1–2.2)
ALP SERPL-CCNC: 112 U/L (ref 40–129)
ALT SERPL-CCNC: 12 U/L (ref 10–40)
ANION GAP SERPL CALCULATED.3IONS-SCNC: 9 MMOL/L (ref 3–16)
AST SERPL-CCNC: 11 U/L (ref 15–37)
BILIRUB SERPL-MCNC: 0.3 MG/DL (ref 0–1)
BUN SERPL-MCNC: 17 MG/DL (ref 7–20)
CALCIUM SERPL-MCNC: 8.9 MG/DL (ref 8.3–10.6)
CHLORIDE SERPL-SCNC: 104 MMOL/L (ref 99–110)
CHOLEST SERPL-MCNC: 139 MG/DL (ref 0–199)
CO2 SERPL-SCNC: 27 MMOL/L (ref 21–32)
CREAT SERPL-MCNC: 0.7 MG/DL (ref 0.6–1.1)
GFR SERPLBLD CREATININE-BSD FMLA CKD-EPI: >90 ML/MIN/{1.73_M2}
GLUCOSE SERPL-MCNC: 194 MG/DL (ref 70–99)
HDLC SERPL-MCNC: 47 MG/DL (ref 40–60)
LDLC SERPL CALC-MCNC: 79 MG/DL
POTASSIUM SERPL-SCNC: 4.3 MMOL/L (ref 3.5–5.1)
PROT SERPL-MCNC: 6.2 G/DL (ref 6.4–8.2)
SODIUM SERPL-SCNC: 140 MMOL/L (ref 136–145)
TRIGL SERPL-MCNC: 63 MG/DL (ref 0–150)
VLDLC SERPL CALC-MCNC: 13 MG/DL

## 2025-04-30 ENCOUNTER — RESULTS FOLLOW-UP (OUTPATIENT)
Dept: FAMILY MEDICINE CLINIC | Age: 55
End: 2025-04-30

## 2025-05-05 DIAGNOSIS — E11.9 TYPE 2 DIABETES MELLITUS WITHOUT COMPLICATION, WITHOUT LONG-TERM CURRENT USE OF INSULIN (HCC): ICD-10-CM

## 2025-05-05 RX ORDER — METOPROLOL SUCCINATE 50 MG/1
50 TABLET, EXTENDED RELEASE ORAL DAILY
Qty: 90 TABLET | Refills: 1 | Status: SHIPPED | OUTPATIENT
Start: 2025-05-05

## 2025-05-05 RX ORDER — ROSUVASTATIN CALCIUM 20 MG/1
20 TABLET, COATED ORAL DAILY
Qty: 90 TABLET | Refills: 1 | Status: SHIPPED | OUTPATIENT
Start: 2025-05-05

## 2025-05-05 RX ORDER — GLIMEPIRIDE 4 MG/1
4 TABLET ORAL EVERY MORNING
Qty: 90 TABLET | Refills: 1 | Status: SHIPPED | OUTPATIENT
Start: 2025-05-05

## 2025-07-31 ENCOUNTER — OFFICE VISIT (OUTPATIENT)
Dept: FAMILY MEDICINE CLINIC | Age: 55
End: 2025-07-31
Payer: COMMERCIAL

## 2025-07-31 VITALS
BODY MASS INDEX: 47.76 KG/M2 | WEIGHT: 287 LBS | SYSTOLIC BLOOD PRESSURE: 116 MMHG | DIASTOLIC BLOOD PRESSURE: 76 MMHG | RESPIRATION RATE: 14 BRPM | OXYGEN SATURATION: 97 % | HEART RATE: 74 BPM

## 2025-07-31 DIAGNOSIS — E11.9 TYPE 2 DIABETES MELLITUS WITHOUT COMPLICATION, WITHOUT LONG-TERM CURRENT USE OF INSULIN (HCC): Primary | ICD-10-CM

## 2025-07-31 DIAGNOSIS — E66.01 OBESITY, MORBID, BMI 40.0-49.9 (HCC): ICD-10-CM

## 2025-07-31 LAB — HBA1C MFR BLD: 7.1 %

## 2025-07-31 PROCEDURE — 1036F TOBACCO NON-USER: CPT | Performed by: FAMILY MEDICINE

## 2025-07-31 PROCEDURE — 3078F DIAST BP <80 MM HG: CPT | Performed by: FAMILY MEDICINE

## 2025-07-31 PROCEDURE — 2022F DILAT RTA XM EVC RTNOPTHY: CPT | Performed by: FAMILY MEDICINE

## 2025-07-31 PROCEDURE — 99214 OFFICE O/P EST MOD 30 MIN: CPT | Performed by: FAMILY MEDICINE

## 2025-07-31 PROCEDURE — 3074F SYST BP LT 130 MM HG: CPT | Performed by: FAMILY MEDICINE

## 2025-07-31 PROCEDURE — 3051F HG A1C>EQUAL 7.0%<8.0%: CPT | Performed by: FAMILY MEDICINE

## 2025-07-31 PROCEDURE — G8417 CALC BMI ABV UP PARAM F/U: HCPCS | Performed by: FAMILY MEDICINE

## 2025-07-31 PROCEDURE — G8427 DOCREV CUR MEDS BY ELIG CLIN: HCPCS | Performed by: FAMILY MEDICINE

## 2025-07-31 PROCEDURE — 3017F COLORECTAL CA SCREEN DOC REV: CPT | Performed by: FAMILY MEDICINE

## 2025-07-31 PROCEDURE — 83036 HEMOGLOBIN GLYCOSYLATED A1C: CPT | Performed by: FAMILY MEDICINE

## 2025-07-31 RX ORDER — TIRZEPATIDE 12.5 MG/.5ML
INJECTION, SOLUTION SUBCUTANEOUS
Qty: 2 ML | Refills: 5 | Status: SHIPPED | OUTPATIENT
Start: 2025-07-31

## 2025-07-31 NOTE — ASSESSMENT & PLAN NOTE
1c improving- now 7.1.  goal is <7.  She wants to continue Mounjaro at 12.5 for now.  Continue metformin/amaryl. No hypoglycemia noted (she has testing supplies to test if needed)      Orders:    POCT glycosylated hemoglobin (Hb A1C)

## 2025-07-31 NOTE — ASSESSMENT & PLAN NOTE
- discussed adding naltrexone for stress eating aid.  She will research and think about it.

## 2025-07-31 NOTE — PROGRESS NOTES
2025    Blood pressure 116/76, pulse 74, resp. rate 14, weight 130.2 kg (287 lb), last menstrual period 2021, SpO2 97%, not currently breastfeeding.    Noelle Alvarez (:  1970) is a 55 y.o. female, here for evaluation of the following medical concerns:    Chief Complaint   Patient presents with    Diabetes     Here for routine follow up of DM-2:   She has had rising A1c of late.  We increased Mounjaro last visit to 12.5 mg.  (She has been off it for 2 wks for cystoscopy )    Also taking metormin 1000 mg q am, amaryl 4 mg.  Takes everything in the morning.  Candy binging: stress eating is decreasing more recently.    Before her cysto she was 'stress eating' was not well controlled- perhaps worry about outcome?  Maybe worry about work?  Her A1c is 7.1 today.  Does not test glucose levels at home. Does not think she would keep a CGM on.  Has had eye exam: [facility] [place]  Lab Results   Component Value Date/Time    LABA1C 8.2 2025 11:10 AM    LABA1C 7.1 2024 11:41 AM    LABA1C 7.6 2024 10:37 AM      She notes a decrease in appetite on the Mounjaro.  She is frustrated that her weight is higher.    Last renal function test: normal.  Lab Results   Component Value Date/Time     2025 10:48 AM    K 4.3 2025 10:48 AM    K 4.1 10/14/2023 11:47 AM     2025 10:48 AM    CO2 27 2025 10:48 AM    BUN 17 2025 10:48 AM    CREATININE 0.7 2025 10:48 AM    GLUCOSE 194 2025 10:48 AM    CALCIUM 8.9 2025 10:48 AM    LABGLOM >90 2025 10:48 AM    LABGLOM >60 10/20/2023 11:36 AM          Lab Results   Component Value Date    CHOL 139 2025    TRIG 63 2025    HDL 47 2025    LDL 79 2025    VLDL 13 2025      No new concerns today.    Patient Active Problem List   Diagnosis    Allergic rhinitis    Vitamin D deficiency    Type 2 diabetes mellitus without complication (HCC) dx 8/15.    Obesity,

## 2025-07-31 NOTE — PATIENT INSTRUCTIONS
Low Dose Naltrexone: look up Dr Renato Mustafa's YouTube presentation. (Dr Mustafa, LDN).  The drug that uses naltrexone for weight mgmt is called \"Contrave\"

## 2025-08-11 ENCOUNTER — HOSPITAL ENCOUNTER (OUTPATIENT)
Dept: CT IMAGING | Age: 55
Discharge: HOME | End: 2025-08-11
Attending: INTERNAL MEDICINE
Payer: COMMERCIAL

## 2025-08-11 DIAGNOSIS — C67.6 MALIGNANT NEOPLASM OF URETERIC ORIFICE (HCC): ICD-10-CM

## 2025-08-11 LAB
PERFORMED ON: NORMAL
POC CREATININE: 0.8 MG/DL (ref 0.6–1.1)
POC SAMPLE TYPE: NORMAL

## 2025-08-11 PROCEDURE — 74177 CT ABD & PELVIS W/CONTRAST: CPT

## 2025-08-11 PROCEDURE — 82565 ASSAY OF CREATININE: CPT

## 2025-08-11 PROCEDURE — 6360000004 HC RX CONTRAST MEDICATION: Performed by: INTERNAL MEDICINE

## 2025-08-11 RX ORDER — IOPAMIDOL 612 MG/ML
50 INJECTION, SOLUTION INTRAVASCULAR
Status: COMPLETED | OUTPATIENT
Start: 2025-08-11 | End: 2025-08-11

## 2025-08-11 RX ORDER — IOPAMIDOL 755 MG/ML
75 INJECTION, SOLUTION INTRAVASCULAR
Status: COMPLETED | OUTPATIENT
Start: 2025-08-11 | End: 2025-08-11

## 2025-08-11 RX ADMIN — IOPAMIDOL 75 ML: 755 INJECTION, SOLUTION INTRAVENOUS at 10:06

## 2025-08-11 RX ADMIN — IOPAMIDOL 50 ML: 612 INJECTION, SOLUTION INTRAVENOUS at 10:06

## (undated) DEVICE — ENDOSCOPY KIT: Brand: MEDLINE INDUSTRIES, INC.

## (undated) DEVICE — FORMALIN CLEAR VIAL 20 ML 10%

## (undated) DEVICE — TRAP POLYP ETRAP

## (undated) DEVICE — SNARE VASC L240CM LOOP W10MM SHTH DIA2.4MM RND STIFF CLD